# Patient Record
Sex: FEMALE | Race: WHITE | Employment: OTHER | URBAN - METROPOLITAN AREA
[De-identification: names, ages, dates, MRNs, and addresses within clinical notes are randomized per-mention and may not be internally consistent; named-entity substitution may affect disease eponyms.]

---

## 2017-01-17 ENCOUNTER — COMMUNICATION - HEALTHEAST (OUTPATIENT)
Dept: SCHEDULING | Facility: CLINIC | Age: 57
End: 2017-01-17

## 2017-01-23 ENCOUNTER — COMMUNICATION - HEALTHEAST (OUTPATIENT)
Dept: INTERNAL MEDICINE | Facility: CLINIC | Age: 57
End: 2017-01-23

## 2017-01-23 DIAGNOSIS — K21.00 REFLUX ESOPHAGITIS: ICD-10-CM

## 2017-01-23 DIAGNOSIS — R07.9 CHEST PAIN, UNSPECIFIED: ICD-10-CM

## 2017-01-23 DIAGNOSIS — E55.9 VITAMIN D DEFICIENCY: ICD-10-CM

## 2017-01-23 DIAGNOSIS — R00.2 PALPITATIONS: ICD-10-CM

## 2017-01-23 DIAGNOSIS — K30 NONULCER DYSPEPSIA: ICD-10-CM

## 2017-01-26 ENCOUNTER — COMMUNICATION - HEALTHEAST (OUTPATIENT)
Dept: INTERNAL MEDICINE | Facility: CLINIC | Age: 57
End: 2017-01-26

## 2017-01-26 ENCOUNTER — AMBULATORY - HEALTHEAST (OUTPATIENT)
Dept: LAB | Facility: CLINIC | Age: 57
End: 2017-01-26

## 2017-01-26 DIAGNOSIS — E55.9 VITAMIN D DEFICIENCY: ICD-10-CM

## 2017-01-26 DIAGNOSIS — R00.2 PALPITATIONS: ICD-10-CM

## 2017-01-26 DIAGNOSIS — R07.9 CHEST PAIN, UNSPECIFIED: ICD-10-CM

## 2017-01-26 DIAGNOSIS — K21.00 REFLUX ESOPHAGITIS: ICD-10-CM

## 2017-01-26 DIAGNOSIS — E03.9 HYPOTHYROIDISM: ICD-10-CM

## 2017-01-26 DIAGNOSIS — K30 NONULCER DYSPEPSIA: ICD-10-CM

## 2017-01-26 LAB
CHOLEST SERPL-MCNC: 228 MG/DL
FASTING STATUS PATIENT QL REPORTED: YES
HDLC SERPL-MCNC: 55 MG/DL
LDLC SERPL CALC-MCNC: 154 MG/DL
TRIGL SERPL-MCNC: 96 MG/DL

## 2017-02-02 ENCOUNTER — OFFICE VISIT - HEALTHEAST (OUTPATIENT)
Dept: INTERNAL MEDICINE | Facility: CLINIC | Age: 57
End: 2017-02-02

## 2017-02-02 DIAGNOSIS — K21.00 REFLUX ESOPHAGITIS: ICD-10-CM

## 2017-02-02 DIAGNOSIS — E78.5 HYPERLIPEMIA: ICD-10-CM

## 2017-02-02 DIAGNOSIS — E55.9 VITAMIN D DEFICIENCY: ICD-10-CM

## 2017-02-02 DIAGNOSIS — E78.00 HYPERCHOLESTEREMIA: ICD-10-CM

## 2017-02-02 ASSESSMENT — MIFFLIN-ST. JEOR: SCORE: 1341.53

## 2017-07-27 ENCOUNTER — COMMUNICATION - HEALTHEAST (OUTPATIENT)
Dept: INTERNAL MEDICINE | Facility: CLINIC | Age: 57
End: 2017-07-27

## 2017-07-27 DIAGNOSIS — E78.00 HYPERCHOLESTEREMIA: ICD-10-CM

## 2017-08-16 ENCOUNTER — COMMUNICATION - HEALTHEAST (OUTPATIENT)
Dept: INTERNAL MEDICINE | Facility: CLINIC | Age: 57
End: 2017-08-16

## 2017-08-23 ENCOUNTER — AMBULATORY - HEALTHEAST (OUTPATIENT)
Dept: LAB | Facility: CLINIC | Age: 57
End: 2017-08-23

## 2017-08-23 DIAGNOSIS — E78.00 HYPERCHOLESTEREMIA: ICD-10-CM

## 2017-08-23 LAB
CHOLEST SERPL-MCNC: 192 MG/DL
FASTING STATUS PATIENT QL REPORTED: YES
HDLC SERPL-MCNC: 47 MG/DL
LDLC SERPL CALC-MCNC: 127 MG/DL
TRIGL SERPL-MCNC: 89 MG/DL

## 2017-08-24 ENCOUNTER — COMMUNICATION - HEALTHEAST (OUTPATIENT)
Dept: INTERNAL MEDICINE | Facility: CLINIC | Age: 57
End: 2017-08-24

## 2017-09-21 ENCOUNTER — HOSPITAL ENCOUNTER (OUTPATIENT)
Dept: MAMMOGRAPHY | Facility: HOSPITAL | Age: 57
Discharge: HOME | End: 2017-09-21
Attending: OBSTETRICS & GYNECOLOGY

## 2017-09-21 DIAGNOSIS — Z12.31 VISIT FOR SCREENING MAMMOGRAM: ICD-10-CM

## 2017-12-06 ENCOUNTER — RECORDS - HEALTHEAST (OUTPATIENT)
Dept: ADMINISTRATIVE | Facility: OTHER | Age: 57
End: 2017-12-06

## 2018-02-15 ENCOUNTER — COMMUNICATION - HEALTHEAST (OUTPATIENT)
Dept: INTERNAL MEDICINE | Facility: CLINIC | Age: 58
End: 2018-02-15

## 2018-02-15 DIAGNOSIS — E78.5 HYPERLIPEMIA: ICD-10-CM

## 2018-05-29 ENCOUNTER — COMMUNICATION - HEALTHEAST (OUTPATIENT)
Dept: INTERNAL MEDICINE | Facility: CLINIC | Age: 58
End: 2018-05-29

## 2018-05-29 DIAGNOSIS — E78.5 HYPERLIPEMIA: ICD-10-CM

## 2018-06-04 ENCOUNTER — RECORDS - HEALTHEAST (OUTPATIENT)
Dept: ADMINISTRATIVE | Facility: OTHER | Age: 58
End: 2018-06-04

## 2018-07-02 ENCOUNTER — RECORDS - HEALTHEAST (OUTPATIENT)
Dept: ADMINISTRATIVE | Facility: OTHER | Age: 58
End: 2018-07-02

## 2018-07-26 ENCOUNTER — COMMUNICATION - HEALTHEAST (OUTPATIENT)
Dept: INTERNAL MEDICINE | Facility: CLINIC | Age: 58
End: 2018-07-26

## 2018-07-26 DIAGNOSIS — E78.5 HYPERLIPEMIA: ICD-10-CM

## 2018-09-21 ENCOUNTER — HOSPITAL ENCOUNTER (OUTPATIENT)
Dept: MAMMOGRAPHY | Facility: CLINIC | Age: 58
Discharge: HOME | End: 2018-09-21
Attending: OBSTETRICS & GYNECOLOGY

## 2018-09-21 DIAGNOSIS — Z12.31 VISIT FOR SCREENING MAMMOGRAM: ICD-10-CM

## 2018-10-09 ENCOUNTER — OFFICE VISIT - HEALTHEAST (OUTPATIENT)
Dept: INTERNAL MEDICINE | Facility: CLINIC | Age: 58
End: 2018-10-09

## 2018-10-09 DIAGNOSIS — E78.00 HYPERCHOLESTEREMIA: ICD-10-CM

## 2018-10-09 DIAGNOSIS — E55.9 VITAMIN D DEFICIENCY: ICD-10-CM

## 2018-10-09 DIAGNOSIS — Z90.710 H/O: HYSTERECTOMY: ICD-10-CM

## 2018-10-09 DIAGNOSIS — R00.2 PALPITATIONS: ICD-10-CM

## 2018-10-09 DIAGNOSIS — K30 NONULCER DYSPEPSIA: ICD-10-CM

## 2018-10-09 DIAGNOSIS — R07.9 CHEST PAIN, UNSPECIFIED: ICD-10-CM

## 2018-10-09 DIAGNOSIS — K21.00 REFLUX ESOPHAGITIS: ICD-10-CM

## 2018-10-09 LAB
ALBUMIN SERPL-MCNC: 3.8 G/DL (ref 3.5–5)
ALP SERPL-CCNC: 99 U/L (ref 45–120)
ALT SERPL W P-5'-P-CCNC: 30 U/L (ref 0–45)
ANION GAP SERPL CALCULATED.3IONS-SCNC: 9 MMOL/L (ref 5–18)
AST SERPL W P-5'-P-CCNC: 19 U/L (ref 0–40)
BILIRUB SERPL-MCNC: 0.6 MG/DL (ref 0–1)
BUN SERPL-MCNC: 11 MG/DL (ref 8–22)
CALCIUM SERPL-MCNC: 10.2 MG/DL (ref 8.5–10.5)
CHLORIDE BLD-SCNC: 105 MMOL/L (ref 98–107)
CHOLEST SERPL-MCNC: 197 MG/DL
CO2 SERPL-SCNC: 28 MMOL/L (ref 22–31)
CREAT SERPL-MCNC: 0.64 MG/DL (ref 0.6–1.1)
ERYTHROCYTE [DISTWIDTH] IN BLOOD BY AUTOMATED COUNT: 11.7 % (ref 11–14.5)
FASTING STATUS PATIENT QL REPORTED: YES
GFR SERPL CREATININE-BSD FRML MDRD: >60 ML/MIN/1.73M2
GLUCOSE BLD-MCNC: 95 MG/DL (ref 70–125)
HCT VFR BLD AUTO: 44.2 % (ref 35–47)
HDLC SERPL-MCNC: 58 MG/DL
HGB BLD-MCNC: 15.2 G/DL (ref 12–16)
LDLC SERPL CALC-MCNC: 122 MG/DL
MCH RBC QN AUTO: 30.6 PG (ref 27–34)
MCHC RBC AUTO-ENTMCNC: 34.4 G/DL (ref 32–36)
MCV RBC AUTO: 89 FL (ref 80–100)
PLATELET # BLD AUTO: 298 THOU/UL (ref 140–440)
PMV BLD AUTO: 7.3 FL (ref 7–10)
POTASSIUM BLD-SCNC: 4.3 MMOL/L (ref 3.5–5)
PROT SERPL-MCNC: 7.3 G/DL (ref 6–8)
RBC # BLD AUTO: 4.97 MILL/UL (ref 3.8–5.4)
SODIUM SERPL-SCNC: 142 MMOL/L (ref 136–145)
TRIGL SERPL-MCNC: 84 MG/DL
TSH SERPL DL<=0.005 MIU/L-ACNC: 0.95 UIU/ML (ref 0.3–5)
WBC: 5.1 THOU/UL (ref 4–11)

## 2018-10-09 ASSESSMENT — MIFFLIN-ST. JEOR: SCORE: 1250.81

## 2018-10-10 ENCOUNTER — COMMUNICATION - HEALTHEAST (OUTPATIENT)
Dept: INTERNAL MEDICINE | Facility: CLINIC | Age: 58
End: 2018-10-10

## 2018-10-10 LAB — 25(OH)D3 SERPL-MCNC: 50.2 NG/ML (ref 30–80)

## 2018-10-17 ENCOUNTER — COMMUNICATION - HEALTHEAST (OUTPATIENT)
Dept: INTERNAL MEDICINE | Facility: CLINIC | Age: 58
End: 2018-10-17

## 2018-10-17 DIAGNOSIS — E78.5 HYPERLIPEMIA: ICD-10-CM

## 2018-11-26 ENCOUNTER — COMMUNICATION - HEALTHEAST (OUTPATIENT)
Dept: INTERNAL MEDICINE | Facility: CLINIC | Age: 58
End: 2018-11-26

## 2018-11-26 DIAGNOSIS — E78.5 HYPERLIPEMIA: ICD-10-CM

## 2018-11-30 ENCOUNTER — COMMUNICATION - HEALTHEAST (OUTPATIENT)
Dept: INTERNAL MEDICINE | Facility: CLINIC | Age: 58
End: 2018-11-30

## 2018-11-30 DIAGNOSIS — K21.9 GERD (GASTROESOPHAGEAL REFLUX DISEASE): ICD-10-CM

## 2018-11-30 DIAGNOSIS — E78.5 HYPERLIPEMIA: ICD-10-CM

## 2018-12-07 ENCOUNTER — RECORDS - HEALTHEAST (OUTPATIENT)
Dept: ADMINISTRATIVE | Facility: OTHER | Age: 58
End: 2018-12-07

## 2019-01-04 ENCOUNTER — AMBULATORY - HEALTHEAST (OUTPATIENT)
Dept: INTERNAL MEDICINE | Facility: CLINIC | Age: 59
End: 2019-01-04

## 2019-01-04 ENCOUNTER — AMBULATORY - HEALTHEAST (OUTPATIENT)
Dept: NURSING | Facility: CLINIC | Age: 59
End: 2019-01-04

## 2019-01-04 DIAGNOSIS — Z23 NEED FOR VACCINATION: ICD-10-CM

## 2019-01-11 ENCOUNTER — RECORDS - HEALTHEAST (OUTPATIENT)
Dept: ADMINISTRATIVE | Facility: OTHER | Age: 59
End: 2019-01-11

## 2019-06-05 ENCOUNTER — RECORDS - HEALTHEAST (OUTPATIENT)
Dept: ADMINISTRATIVE | Facility: OTHER | Age: 59
End: 2019-06-05

## 2019-06-05 ENCOUNTER — COMMUNICATION - HEALTHEAST (OUTPATIENT)
Dept: INTERNAL MEDICINE | Facility: CLINIC | Age: 59
End: 2019-06-05

## 2019-06-05 ENCOUNTER — AMBULATORY - HEALTHEAST (OUTPATIENT)
Dept: SCHEDULING | Facility: CLINIC | Age: 59
End: 2019-06-05

## 2019-06-05 DIAGNOSIS — Z13.9 ENCOUNTER FOR HEALTH-RELATED SCREENING: ICD-10-CM

## 2019-06-05 DIAGNOSIS — E78.5 HYPERLIPEMIA: ICD-10-CM

## 2019-06-17 ENCOUNTER — TRANSFERRED RECORDS (OUTPATIENT)
Dept: HEALTH INFORMATION MANAGEMENT | Facility: CLINIC | Age: 59
End: 2019-06-17

## 2019-06-17 ENCOUNTER — RECORDS - HEALTHEAST (OUTPATIENT)
Dept: ADMINISTRATIVE | Facility: OTHER | Age: 59
End: 2019-06-17

## 2019-07-15 ENCOUNTER — TRANSFERRED RECORDS (OUTPATIENT)
Dept: HEALTH INFORMATION MANAGEMENT | Facility: CLINIC | Age: 59
End: 2019-07-15

## 2019-07-16 ENCOUNTER — DOCUMENTATION ONLY (OUTPATIENT)
Dept: CARE COORDINATION | Facility: CLINIC | Age: 59
End: 2019-07-16

## 2019-08-03 NOTE — TELEPHONE ENCOUNTER
FUTURE VISIT INFORMATION      FUTURE VISIT INFORMATION:    Date: 8.26.19    Time: 1:00    Location: [facility]  REFERRAL INFORMATION:    Referring provider:  N/A    Referring providers clinic:  N/A    Reason for visit/diagnosis: second opinion for mycosis fungoides- Okay per Bohj     RECORDS REQUESTED FROM:       Clinic name Comments Records Status Imaging Status   [facility] 7.15.19  In Epic     6.17.19 In Epic

## 2019-08-26 ENCOUNTER — PRE VISIT (OUTPATIENT)
Dept: DERMATOLOGY | Facility: CLINIC | Age: 59
End: 2019-08-26

## 2019-08-26 ENCOUNTER — OFFICE VISIT (OUTPATIENT)
Dept: DERMATOLOGY | Facility: CLINIC | Age: 59
End: 2019-08-26
Payer: COMMERCIAL

## 2019-08-26 VITALS — SYSTOLIC BLOOD PRESSURE: 129 MMHG | DIASTOLIC BLOOD PRESSURE: 82 MMHG | OXYGEN SATURATION: 99 % | HEART RATE: 71 BPM

## 2019-08-26 DIAGNOSIS — C84.00 MYCOSIS FUNGOIDES, UNSPECIFIED BODY REGION (H): Primary | ICD-10-CM

## 2019-08-26 RX ORDER — TRIAMCINOLONE ACETONIDE 1 MG/G
OINTMENT TOPICAL
Qty: 80 G | Refills: 2 | Status: SHIPPED | OUTPATIENT
Start: 2019-08-26

## 2019-08-26 RX ORDER — ATORVASTATIN CALCIUM 20 MG/1
TABLET, FILM COATED ORAL
Refills: 9 | COMMUNITY
Start: 2019-08-25 | End: 2022-11-22

## 2019-08-26 RX ORDER — EPINEPHRINE 0.3 MG/.3ML
INJECTION SUBCUTANEOUS
COMMUNITY
Start: 2017-02-02

## 2019-08-26 ASSESSMENT — PAIN SCALES - GENERAL: PAINLEVEL: NO PAIN (0)

## 2019-08-26 NOTE — LETTER
"8/26/2019       RE: Nena Huerta  [address]  [place] [address]     Dear Colleague,    Thank you for referring your patient, Nena Huerta, to the [facility] DERMATOLOGY at [facility]. Please see a copy of my visit note below.    [facility] Dermatology Note      Dermatology Problem List:  1. CTCL, Stage 1A, bx 6/17/19 (read by [facility])  - CBC w/ diff, peripheral smear, LFT and LDH 7/30/19 within normal limits  - Tx: Triamcinolone 0.1% ointment BID  2. Hx of BCC on the right nare  3. Hx of \"pre-melanoma\" posterior right thigh    Encounter Date: Aug 26, 2019    CC:   Chief Complaint   Patient presents with     Derm Problem     Nena is here today  for a second opinion of  Mycosis Fungoides Dx. She has been seen previously at [facility] by Dr.Kristina Sofia.          History of Present Illness:  Ms. Nena Huerta is a 58 year old female who presents in consultation from Dr. Sofia for CTCL. Patient reports her  first noticed the rash in January of this year -- appeared as red marks on her sides. Was entirely asymptomatic at the time. However, over time the rash didn't go away. Has a history of hand eczema and thought it was just dry skin -- however, it still didn't go away with this. She then saw her OBGYN who recommended that she see dermatology. She then had the biopsies which showed MF. Thus far has not started any treatments. Reports 20lb unanticipated weight loss, few night sweats. No fevers, chills, new lumps bumps. Also endorses itching on the back of her scalp/neck.    Past Medical History:   There is no problem list on file for this patient.    No past medical history on file.  No past surgical history on file.  High cholesterol  GERD    Social History:  Patient reports that she has never smoked. She has never used smokeless tobacco.    Family History:  No family history on file.   No known family hx of skin " disease    Medications:  Current Outpatient Medications   Medication Sig Dispense Refill     atorvastatin (LIPITOR) 20 MG tablet   9     EPINEPHrine (EPIPEN/ADRENACLICK/OR ANY BX GENERIC EQUIV) 0.3 MG/0.3ML injection 2-pack USE AS DIRECTED       omeprazole (PRILOSEC) 20 MG DR capsule   10        Allergies   Allergen Reactions     Avocado Anaphylaxis     Bees Anaphylaxis     Morphine Nausea and Vomiting, Hives, Itching and Rash         Review of Systems:  -As per HPI  -Constitutional: Otherwise feeling well today, in usual state of health.  -Skin: As above in HPI. No additional skin concerns.    Physical exam:  Vitals: /82   Pulse 71   SpO2 99%   GEN: This is a well developed, well-nourished female in no acute distress, in a pleasant mood.    SKIN: Full skin, which includes the head/face, both arms, chest, back, abdomen,both legs, genitalia and/or groin buttocks, digits and/or nails, was examined.  -Beaver skin type: II-III  -Ill defined red, slightly shiny and wrinkled appearing digitate patches on the right flank, left lateral breast and left flank (<10% TBSA)  -No other lesions of concern on areas examined.   LYMPH: no cervical, supraclavicular, axillary or inguinal LAD    Impression/Plan:  1. Mycosis fungoides/CTCL, Stage IA: Biopsy 6/17/19 c/w this diagnosis, CBC and peripheral smear from July 2019 within normal limits: Reviewed the etiology and natural history of this skin condition with the patient at length. Discussed that this is an indolent and slowly progressive disease. Reviewed our expectations and goals of care are to control skin disease and monitor for progression. Given limited disease would start with topical corticosteroids.    Start triamcinolone 0.1% ointment BID to affected areas on the trunk    Reviewed that she should monitor for B symptoms (drenching night sweats, unexpected weight loss, fatigue, etc.)          CC Lupe Sofia MD  [facility]  [address] [place] [address]  [place] [address] on close of this encounter.  Follow-up in 3 months, earlier for new or changing lesions.       Dr. Valles staffed the patient.    Staff Involved:  Resident(Shay Perla)/Staff    Shay Perla MD  Dermatology Resident, PGY4    I, Janis Valles MD, saw this patient with the resident and agree with the resident s findings and plan of care as documented in the resident s note.      Again, thank you for allowing me to participate in the care of your patient.      Sincerely,    Janis Valles MD

## 2019-08-26 NOTE — PATIENT INSTRUCTIONS
Mycosis Fungoides  Mycosis fungoides (MF) is the most common type of CTCL, accounting for 50% of all primary CTCL cases. Erythematous patches and plaques with fine scale and tumors that anatomically favor the buttocks and sun-protected areas of the trunk and limbs characterize this subtype. The etiology remains unclear.    MF is approximately twice as common in men as in women, and sex differences in incidence increase with age. MF is mostly seen in older adults (median age at presentation is 50-55 years), although any age group may be affected.    Start Triamcinolone 0.1% ointment two times per day to the affected areas.

## 2019-08-26 NOTE — NURSING NOTE
Chief Complaint   Patient presents with     Derm Problem     Nena is here today  for a second opinion of  Mycosis Fungoides Dx. She has been seen previously at [facility] by Dr.Kristina Sofia.      Georgie Garcia LPN

## 2019-10-04 ENCOUNTER — HEALTH MAINTENANCE LETTER (OUTPATIENT)
Age: 59
End: 2019-10-04

## 2019-10-22 ENCOUNTER — OFFICE VISIT - HEALTHEAST (OUTPATIENT)
Dept: INTERNAL MEDICINE | Facility: CLINIC | Age: 59
End: 2019-10-22

## 2019-10-22 DIAGNOSIS — C84.09 MYCOSIS FUNGOIDES, EXTRANODAL AND SOLID ORGAN SITES (H): ICD-10-CM

## 2019-10-22 DIAGNOSIS — K30 NONULCER DYSPEPSIA: ICD-10-CM

## 2019-10-22 DIAGNOSIS — K21.00 REFLUX ESOPHAGITIS: ICD-10-CM

## 2019-10-22 DIAGNOSIS — E78.5 HYPERLIPEMIA: ICD-10-CM

## 2019-10-22 DIAGNOSIS — E78.00 HYPERCHOLESTEREMIA: ICD-10-CM

## 2019-10-22 DIAGNOSIS — K21.9 GERD (GASTROESOPHAGEAL REFLUX DISEASE): ICD-10-CM

## 2019-10-22 DIAGNOSIS — E55.9 VITAMIN D DEFICIENCY: ICD-10-CM

## 2019-10-22 LAB
ALBUMIN SERPL-MCNC: 4.1 G/DL (ref 3.5–5)
ALP SERPL-CCNC: 99 U/L (ref 45–120)
ALT SERPL W P-5'-P-CCNC: 22 U/L (ref 0–45)
ANION GAP SERPL CALCULATED.3IONS-SCNC: 10 MMOL/L (ref 5–18)
AST SERPL W P-5'-P-CCNC: 16 U/L (ref 0–40)
BILIRUB SERPL-MCNC: 0.8 MG/DL (ref 0–1)
BUN SERPL-MCNC: 14 MG/DL (ref 8–22)
CALCIUM SERPL-MCNC: 10.4 MG/DL (ref 8.5–10.5)
CHLORIDE BLD-SCNC: 105 MMOL/L (ref 98–107)
CHOLEST SERPL-MCNC: 211 MG/DL
CO2 SERPL-SCNC: 29 MMOL/L (ref 22–31)
CREAT SERPL-MCNC: 0.68 MG/DL (ref 0.6–1.1)
ERYTHROCYTE [DISTWIDTH] IN BLOOD BY AUTOMATED COUNT: 11.5 % (ref 11–14.5)
FASTING STATUS PATIENT QL REPORTED: YES
GFR SERPL CREATININE-BSD FRML MDRD: >60 ML/MIN/1.73M2
GLUCOSE BLD-MCNC: 95 MG/DL (ref 70–125)
HCT VFR BLD AUTO: 46.4 % (ref 35–47)
HDLC SERPL-MCNC: 61 MG/DL
HGB BLD-MCNC: 15.7 G/DL (ref 12–16)
LDLC SERPL CALC-MCNC: 131 MG/DL
MCH RBC QN AUTO: 30.8 PG (ref 27–34)
MCHC RBC AUTO-ENTMCNC: 33.8 G/DL (ref 32–36)
MCV RBC AUTO: 91 FL (ref 80–100)
PLATELET # BLD AUTO: 302 THOU/UL (ref 140–440)
PMV BLD AUTO: 7.6 FL (ref 7–10)
POTASSIUM BLD-SCNC: 4.7 MMOL/L (ref 3.5–5)
PROT SERPL-MCNC: 7.1 G/DL (ref 6–8)
RBC # BLD AUTO: 5.09 MILL/UL (ref 3.8–5.4)
SODIUM SERPL-SCNC: 144 MMOL/L (ref 136–145)
TRIGL SERPL-MCNC: 95 MG/DL
TSH SERPL DL<=0.005 MIU/L-ACNC: 0.75 UIU/ML (ref 0.3–5)
WBC: 4.9 THOU/UL (ref 4–11)

## 2019-10-22 ASSESSMENT — MIFFLIN-ST. JEOR: SCORE: 1269.14

## 2019-10-23 ENCOUNTER — COMMUNICATION - HEALTHEAST (OUTPATIENT)
Dept: ONCOLOGY | Facility: HOSPITAL | Age: 59
End: 2019-10-23

## 2019-10-23 LAB — 25(OH)D3 SERPL-MCNC: 49.2 NG/ML (ref 30–80)

## 2019-10-24 ENCOUNTER — COMMUNICATION - HEALTHEAST (OUTPATIENT)
Dept: INTERNAL MEDICINE | Facility: CLINIC | Age: 59
End: 2019-10-24

## 2019-10-28 ENCOUNTER — HOSPITAL ENCOUNTER (OUTPATIENT)
Dept: MAMMOGRAPHY | Facility: CLINIC | Age: 59
Discharge: HOME | End: 2019-10-28
Attending: INTERNAL MEDICINE

## 2019-10-28 DIAGNOSIS — Z12.31 VISIT FOR SCREENING MAMMOGRAM: ICD-10-CM

## 2019-11-11 ENCOUNTER — OFFICE VISIT - HEALTHEAST (OUTPATIENT)
Dept: ONCOLOGY | Facility: HOSPITAL | Age: 59
End: 2019-11-11

## 2019-11-11 DIAGNOSIS — C84.09 MYCOSIS FUNGOIDES, EXTRANODAL AND SOLID ORGAN SITES (H): ICD-10-CM

## 2019-11-11 DIAGNOSIS — E78.5 HYPERLIPEMIA: ICD-10-CM

## 2019-11-11 DIAGNOSIS — K21.9 GERD (GASTROESOPHAGEAL REFLUX DISEASE): ICD-10-CM

## 2019-11-11 ASSESSMENT — MIFFLIN-ST. JEOR: SCORE: 1266.57

## 2019-11-25 ENCOUNTER — OFFICE VISIT (OUTPATIENT)
Dept: DERMATOLOGY | Facility: CLINIC | Age: 59
End: 2019-11-25
Payer: COMMERCIAL

## 2019-11-25 DIAGNOSIS — C84.00 MYCOSIS FUNGOIDES, UNSPECIFIED BODY REGION (H): Primary | ICD-10-CM

## 2019-11-25 ASSESSMENT — PAIN SCALES - GENERAL: PAINLEVEL: NO PAIN (0)

## 2019-11-25 NOTE — NURSING NOTE
Dermatology Rooming Note    Nena Huerta's goals for this visit include:   Chief Complaint   Patient presents with     Derm Problem     Nena is here for a ctcl skin check, states new areas under her breasts.         Nanda Alvarado LPN

## 2019-11-25 NOTE — PROGRESS NOTES
"[facility] Dermatology Note      Dermatology Problem List:  1. CTCL, Stage 1A, bx 19 (read by [facility])  - CBC w/ diff, peripheral smear, LFT and LDH 19 within normal limits  - Tx: Triamcinolone 0.1% ointment BID  2. Hx of BCC on the right nare  3. Hx of \"pre-melanoma\" posterior right thigh    Encounter Date: 2019    CC:   Chief Complaint   Patient presents with     Derm Problem     Nena is here for a ctcl skin check, states new areas under her breasts.           History of Present Illness:  Ms. Nena Huerta is a 59 year old female who presents as a follow-up for patch stage IA CTCL. The patient was last seen 19 when she was started on TAC 0.1% ointment BID to affected areas.  She has been only applying triamcinolone ointment, but in fact has stopped using since the beginning of November because she was concerned about the skin thinning effects. She states that her rash is much better, though she has gotten a new rash under her abdomen over her  line. She was prescribed an antifungal for this rash from her regular dermatologist but she has not used it yet.  She denies F/C/unintentional weight loss at this time. She does endorse night sweats on occasion, but these are not new and present at last visit. She has also seen Oncology who reassured her of her early stage MF.       Past Medical History:   There is no problem list on file for this patient.    No past medical history on file.  No past surgical history on file.    Social History:  Patient reports that she has never smoked. She has never used smokeless tobacco.    Family History:  No family history on file.    Medications:  Current Outpatient Medications   Medication Sig Dispense Refill     atorvastatin (LIPITOR) 20 MG tablet   9     EPINEPHrine (EPIPEN/ADRENACLICK/OR ANY BX GENERIC EQUIV) 0.3 MG/0.3ML injection 2-pack USE AS DIRECTED       omeprazole (PRILOSEC) 20 MG DR capsule   10     triamcinolone (KENALOG) " 0.1 % external ointment Apply two times per day to the affected areas on the trunk. 80 g 2        Allergies   Allergen Reactions     Avocado Anaphylaxis     Bees Anaphylaxis     Morphine Nausea and Vomiting, Hives, Itching and Rash         Review of Systems:  -As per HPI  -Constitutional: Otherwise feeling well today, in usual state of health.  -Skin: As above in HPI. No additional skin concerns.    Physical exam:  Vitals: There were no vitals taken for this visit.  GEN: This is a well developed, well-nourished female in no acute distress, in a pleasant mood.    SKIN: Total skin excluding the undergarment areas was performed. The exam included the head/face, neck, both arms, chest, back, abdomen, both legs, digits and/or nails.   -Beaver skin type: I  -Very faint pink circular patch on the right upper abdomen  -Faint pink patch extending under the abdoimdal pannus, with several satellite pink papules  -No other lesions of concern on areas examined.   LYMPH: No cervical, supraclavicular, axillary, inguinal or popliteal LAD    Impression/Plan:  1. Patch stage IA CTCL- Limited rash on exam, very faint. Discussed good prognosis of her limited stage MF. Ok to just use the topical steroid on an as-needed basis and discussed side effects/and use of topical steroids.    Continue TAC 0.1% ointment BID PRN; discussed can use 2 weeks on, 1 week off    2. Cutaneous candidiasis/intertrigo- Patient was given a topical antifungal by outside dermatologist. She has not used yet.     Encouraged to use prescribed topical antifungal cream BID to the rash under the abdomen      CC Lupe Sofia MD  [facility]  [address] [place] [address] on close of this encounter.  Follow-up in 1 year, earlier for new or changing lesions.       Dr. Valles staffed the patient.    Staff Involved:  Resident(Roseanna Catherine)/Staff  I, Janis Valles MD, saw this patient with the resident and agree with  the resident s findings and plan of care as documented in the resident s note.

## 2019-11-25 NOTE — LETTER
"2019       RE: Nena Huerta  [address]  [place] [address]     Dear Colleague,    Thank you for referring your patient, Nena Huerta, to the [facility] DERMATOLOGY at [facility]. Please see a copy of my visit note below.    [facility] Dermatology Note      Dermatology Problem List:  1. CTCL, Stage 1A, bx 19 (read by [facility])  - CBC w/ diff, peripheral smear, LFT and LDH 19 within normal limits  - Tx: Triamcinolone 0.1% ointment BID  2. Hx of BCC on the right nare  3. Hx of \"pre-melanoma\" posterior right thigh    Encounter Date: 2019    CC:   Chief Complaint   Patient presents with     Derm Problem     Nena is here for a ctcl skin check, states new areas under her breasts.           History of Present Illness:  Ms. Nena Huerta is a 59 year old female who presents as a follow-up for patch stage IA CTCL. The patient was last seen 19 when she was started on TAC 0.1% ointment BID to affected areas.  She has been only applying triamcinolone ointment, but in fact has stopped using since the beginning of November because she was concerned about the skin thinning effects. She states that her rash is much better, though she has gotten a new rash under her abdomen over her  line. She was prescribed an antifungal for this rash from her regular dermatologist but she has not used it yet.  She denies F/C/unintentional weight loss at this time. She does endorse night sweats on occasion, but these are not new and present at last visit. She has also seen Oncology who reassured her of her early stage MF.       Past Medical History:   There is no problem list on file for this patient.    No past medical history on file.  No past surgical history on file.    Social History:  Patient reports that she has never smoked. She has never used smokeless tobacco.    Family History:  No family history on file.    Medications:  Current Outpatient " Medications   Medication Sig Dispense Refill     atorvastatin (LIPITOR) 20 MG tablet   9     EPINEPHrine (EPIPEN/ADRENACLICK/OR ANY BX GENERIC EQUIV) 0.3 MG/0.3ML injection 2-pack USE AS DIRECTED       omeprazole (PRILOSEC) 20 MG DR capsule   10     triamcinolone (KENALOG) 0.1 % external ointment Apply two times per day to the affected areas on the trunk. 80 g 2        Allergies   Allergen Reactions     Avocado Anaphylaxis     Bees Anaphylaxis     Morphine Nausea and Vomiting, Hives, Itching and Rash         Review of Systems:  -As per HPI  -Constitutional: Otherwise feeling well today, in usual state of health.  -Skin: As above in HPI. No additional skin concerns.    Physical exam:  Vitals: There were no vitals taken for this visit.  GEN: This is a well developed, well-nourished female in no acute distress, in a pleasant mood.    SKIN: Total skin excluding the undergarment areas was performed. The exam included the head/face, neck, both arms, chest, back, abdomen, both legs, digits and/or nails.   -Beaver skin type: I  -Very faint pink circular patch on the right upper abdomen  -Faint pink patch extending under the abdoimdal pannus, with several satellite pink papules  -No other lesions of concern on areas examined.   LYMPH: No cervical, supraclavicular, axillary, inguinal or popliteal LAD    Impression/Plan:  1. Patch stage IA CTCL- Limited rash on exam, very faint. Discussed good prognosis of her limited stage MF. Ok to just use the topical steroid on an as-needed basis and discussed side effects/and use of topical steroids.    Continue TAC 0.1% ointment BID PRN; discussed can use 2 weeks on, 1 week off    2. Cutaneous candidiasis/intertrigo- Patient was given a topical antifungal by outside dermatologist. She has not used yet.     Encouraged to use prescribed topical antifungal cream BID to the rash under the abdomen      CC Lupe Sofia MD  [facility]  [address] [place] [address]   [address]  [place] [address] on close of this encounter.  Follow-up in 1 year, earlier for new or changing lesions.       Dr. Valles staffed the patient.    Staff Involved:  Resident(Roseanna Catherine)/Staff  I, Janis Valles MD, saw this patient with the resident and agree with the resident s findings and plan of care as documented in the resident s note.

## 2019-11-25 NOTE — PATIENT INSTRUCTIONS
Ok to continue triamcinolone ointment for rash or more itching. Use twice a day for 2 weeks, then take 1 week off  Apply antifungal you have been prescribed from your dermatologist twice a day to the rash under the abdomen

## 2019-12-20 ENCOUNTER — RECORDS - HEALTHEAST (OUTPATIENT)
Dept: ADMINISTRATIVE | Facility: OTHER | Age: 59
End: 2019-12-20

## 2020-01-02 ENCOUNTER — RECORDS - HEALTHEAST (OUTPATIENT)
Dept: ADMINISTRATIVE | Facility: OTHER | Age: 60
End: 2020-01-02

## 2020-02-11 ENCOUNTER — OFFICE VISIT - HEALTHEAST (OUTPATIENT)
Dept: INTERNAL MEDICINE | Facility: CLINIC | Age: 60
End: 2020-02-11

## 2020-02-11 DIAGNOSIS — E78.5 HYPERLIPEMIA: ICD-10-CM

## 2020-02-11 DIAGNOSIS — C84.A0 CUTANEOUS T-CELL LYMPHOMA, UNSPECIFIED BODY REGION (H): ICD-10-CM

## 2020-02-11 DIAGNOSIS — K21.9 GERD (GASTROESOPHAGEAL REFLUX DISEASE): ICD-10-CM

## 2020-02-11 DIAGNOSIS — M54.50 LOW BACK PAIN, UNSPECIFIED BACK PAIN LATERALITY, UNSPECIFIED CHRONICITY, UNSPECIFIED WHETHER SCIATICA PRESENT: ICD-10-CM

## 2020-02-11 ASSESSMENT — MIFFLIN-ST. JEOR: SCORE: 1279.73

## 2020-07-08 NOTE — PROGRESS NOTES
"[facility] Dermatology Note      Dermatology Problem List:  1. CTCL, Stage 1A, bx 6/17/19 (read by [facility])  - CBC w/ diff, peripheral smear, LFT and LDH 7/30/19 within normal limits  - Tx: Triamcinolone 0.1% ointment BID  2. Hx of BCC on the right nare  3. Hx of \"pre-melanoma\" posterior right thigh    Encounter Date: Aug 26, 2019    CC:   Chief Complaint   Patient presents with     Derm Problem     Nena is here today  for a second opinion of  Mycosis Fungoides Dx. She has been seen previously at [facility] by Dr.Kristina Sofia.          History of Present Illness:  Ms. Nena Huerta is a 58 year old female who presents in consultation from Dr. Sofia for CTCL. Patient reports her  first noticed the rash in January of this year -- appeared as red marks on her sides. Was entirely asymptomatic at the time. However, over time the rash didn't go away. Has a history of hand eczema and thought it was just dry skin -- however, it still didn't go away with this. She then saw her OBGYN who recommended that she see dermatology. She then had the biopsies which showed MF. Thus far has not started any treatments. Reports 20lb unanticipated weight loss, few night sweats. No fevers, chills, new lumps bumps. Also endorses itching on the back of her scalp/neck.    Past Medical History:   There is no problem list on file for this patient.    No past medical history on file.  No past surgical history on file.  High cholesterol  GERD    Social History:  Patient reports that she has never smoked. She has never used smokeless tobacco.    Family History:  No family history on file.   No known family hx of skin disease    Medications:  Current Outpatient Medications   Medication Sig Dispense Refill     atorvastatin (LIPITOR) 20 MG tablet   9     EPINEPHrine (EPIPEN/ADRENACLICK/OR ANY BX GENERIC EQUIV) 0.3 MG/0.3ML injection 2-pack USE AS DIRECTED       omeprazole (PRILOSEC) 20 MG DR capsule   10 " Rx Request        Allergies   Allergen Reactions     Avocado Anaphylaxis     Bees Anaphylaxis     Morphine Nausea and Vomiting, Hives, Itching and Rash         Review of Systems:  -As per HPI  -Constitutional: Otherwise feeling well today, in usual state of health.  -Skin: As above in HPI. No additional skin concerns.    Physical exam:  Vitals: /82   Pulse 71   SpO2 99%   GEN: This is a well developed, well-nourished female in no acute distress, in a pleasant mood.    SKIN: Full skin, which includes the head/face, both arms, chest, back, abdomen,both legs, genitalia and/or groin buttocks, digits and/or nails, was examined.  -Beaver skin type: II-III  -Ill defined red, slightly shiny and wrinkled appearing digitate patches on the right flank, left lateral breast and left flank (<10% TBSA)  -No other lesions of concern on areas examined.   LYMPH: no cervical, supraclavicular, axillary or inguinal LAD    Impression/Plan:  1. Mycosis fungoides/CTCL, Stage IA: Biopsy 6/17/19 c/w this diagnosis, CBC and peripheral smear from July 2019 within normal limits: Reviewed the etiology and natural history of this skin condition with the patient at length. Discussed that this is an indolent and slowly progressive disease. Reviewed our expectations and goals of care are to control skin disease and monitor for progression. Given limited disease would start with topical corticosteroids.    Start triamcinolone 0.1% ointment BID to affected areas on the trunk    Reviewed that she should monitor for B symptoms (drenching night sweats, unexpected weight loss, fatigue, etc.)           Lupe Sofia MD  [facility]  [address] [place] [address] on close of this encounter.  Follow-up in 3 months, earlier for new or changing lesions.       Dr. Valles staffed the patient.    Staff Involved:  Resident(Shay Perla)/Staff    Shay Perla MD  Dermatology Resident, PGY4    I, Janis Valles MD, saw this  patient with the resident and agree with the resident s findings and plan of care as documented in the resident s note.

## 2020-10-06 ENCOUNTER — OFFICE VISIT - HEALTHEAST (OUTPATIENT)
Dept: INTERNAL MEDICINE | Facility: CLINIC | Age: 60
End: 2020-10-06

## 2020-10-06 DIAGNOSIS — Z00.00 ROUTINE GENERAL MEDICAL EXAMINATION AT A HEALTH CARE FACILITY: ICD-10-CM

## 2020-10-06 DIAGNOSIS — E78.5 HYPERLIPEMIA: ICD-10-CM

## 2020-10-06 DIAGNOSIS — K21.9 GERD (GASTROESOPHAGEAL REFLUX DISEASE): ICD-10-CM

## 2020-10-06 LAB
ALBUMIN SERPL-MCNC: 3.7 G/DL (ref 3.5–5)
ALP SERPL-CCNC: 89 U/L (ref 45–120)
ALT SERPL W P-5'-P-CCNC: 24 U/L (ref 0–45)
ANION GAP SERPL CALCULATED.3IONS-SCNC: 7 MMOL/L (ref 5–18)
AST SERPL W P-5'-P-CCNC: 15 U/L (ref 0–40)
BASOPHILS # BLD AUTO: 0 THOU/UL (ref 0–0.2)
BASOPHILS NFR BLD AUTO: 1 % (ref 0–2)
BILIRUB SERPL-MCNC: 0.7 MG/DL (ref 0–1)
BUN SERPL-MCNC: 14 MG/DL (ref 8–22)
CALCIUM SERPL-MCNC: 9.7 MG/DL (ref 8.5–10.5)
CHLORIDE BLD-SCNC: 104 MMOL/L (ref 98–107)
CHOLEST SERPL-MCNC: 174 MG/DL
CO2 SERPL-SCNC: 30 MMOL/L (ref 22–31)
CREAT SERPL-MCNC: 0.61 MG/DL (ref 0.6–1.1)
EOSINOPHIL # BLD AUTO: 0.1 THOU/UL (ref 0–0.4)
EOSINOPHIL NFR BLD AUTO: 2 % (ref 0–6)
ERYTHROCYTE [DISTWIDTH] IN BLOOD BY AUTOMATED COUNT: 11.1 % (ref 11–14.5)
FASTING STATUS PATIENT QL REPORTED: YES
GFR SERPL CREATININE-BSD FRML MDRD: >60 ML/MIN/1.73M2
GLUCOSE BLD-MCNC: 99 MG/DL (ref 70–125)
HCT VFR BLD AUTO: 43.8 % (ref 35–47)
HDLC SERPL-MCNC: 54 MG/DL
HGB BLD-MCNC: 14.8 G/DL (ref 12–16)
LDLC SERPL CALC-MCNC: 103 MG/DL
LYMPHOCYTES # BLD AUTO: 1.3 THOU/UL (ref 0.8–4.4)
LYMPHOCYTES NFR BLD AUTO: 24 % (ref 20–40)
MCH RBC QN AUTO: 30.8 PG (ref 27–34)
MCHC RBC AUTO-ENTMCNC: 33.8 G/DL (ref 32–36)
MCV RBC AUTO: 91 FL (ref 80–100)
MONOCYTES # BLD AUTO: 0.4 THOU/UL (ref 0–0.9)
MONOCYTES NFR BLD AUTO: 7 % (ref 2–10)
NEUTROPHILS # BLD AUTO: 3.6 THOU/UL (ref 2–7.7)
NEUTROPHILS NFR BLD AUTO: 67 % (ref 50–70)
PLATELET # BLD AUTO: 274 THOU/UL (ref 140–440)
PMV BLD AUTO: 7.6 FL (ref 7–10)
POTASSIUM BLD-SCNC: 4.4 MMOL/L (ref 3.5–5)
PROT SERPL-MCNC: 6.9 G/DL (ref 6–8)
RBC # BLD AUTO: 4.81 MILL/UL (ref 3.8–5.4)
SODIUM SERPL-SCNC: 141 MMOL/L (ref 136–145)
TRIGL SERPL-MCNC: 84 MG/DL
TSH SERPL DL<=0.005 MIU/L-ACNC: 0.97 UIU/ML (ref 0.3–5)
WBC: 5.3 THOU/UL (ref 4–11)

## 2020-10-06 ASSESSMENT — MIFFLIN-ST. JEOR: SCORE: 1233.8

## 2020-10-20 ENCOUNTER — RECORDS - HEALTHEAST (OUTPATIENT)
Dept: ADMINISTRATIVE | Facility: OTHER | Age: 60
End: 2020-10-20

## 2020-10-26 ENCOUNTER — COMMUNICATION - HEALTHEAST (OUTPATIENT)
Dept: INTERNAL MEDICINE | Facility: CLINIC | Age: 60
End: 2020-10-26

## 2020-10-27 ENCOUNTER — COMMUNICATION - HEALTHEAST (OUTPATIENT)
Dept: INTERNAL MEDICINE | Facility: CLINIC | Age: 60
End: 2020-10-27

## 2020-11-03 ENCOUNTER — HOSPITAL ENCOUNTER (OUTPATIENT)
Dept: MAMMOGRAPHY | Facility: CLINIC | Age: 60
Discharge: HOME | End: 2020-11-03

## 2020-11-03 DIAGNOSIS — Z12.31 VISIT FOR SCREENING MAMMOGRAM: ICD-10-CM

## 2020-11-08 ENCOUNTER — HEALTH MAINTENANCE LETTER (OUTPATIENT)
Age: 60
End: 2020-11-08

## 2020-11-17 ENCOUNTER — RECORDS - HEALTHEAST (OUTPATIENT)
Dept: ADMINISTRATIVE | Facility: OTHER | Age: 60
End: 2020-11-17

## 2020-11-23 ENCOUNTER — OFFICE VISIT (OUTPATIENT)
Dept: DERMATOLOGY | Facility: CLINIC | Age: 60
End: 2020-11-23
Payer: COMMERCIAL

## 2020-11-23 DIAGNOSIS — C84.00 MYCOSIS FUNGOIDES, UNSPECIFIED BODY REGION (H): ICD-10-CM

## 2020-11-23 PROCEDURE — 99214 OFFICE O/P EST MOD 30 MIN: CPT | Mod: GC | Performed by: DERMATOLOGY

## 2020-11-23 ASSESSMENT — PAIN SCALES - GENERAL: PAINLEVEL: NO PAIN (0)

## 2020-11-23 NOTE — LETTER
"11/23/2020       RE: Nena Huerta  [address] [address]     Dear Colleague,    Thank you for referring your patient, Nena Huerta, to the [facility]OLOG[facility]IC [place] at [facility]. Please see a copy of my visit note below.    [facility] Dermatology Note      Dermatology Problem List:  1. CTCL, Stage 1A, bx 6/17/19 (read by [facility])  - CBC w/ diff, peripheral smear, LFT and LDH 7/30/19 within normal limits  - Tx: Triamcinolone 0.1% ointment BID  2. Hx of BCC on the right nare  3. 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          History of Present Illness:  Ms. Nena Huerta is a 60 year old female who presents as a follow-up for patch stage IA CTCL. The patient was last seen 11/25/2019 when she was continued on triamcinolone 0.1% ointment BID to affected areas. She says that she does have some spots on her right lateral trunk, on the left breast and she thinks maybe some on the back. She occasionally uses triamcinolone ointment when these spots get very itchy, but she doesn't use it routinely.     She also had some candidal intertrigo at last visit and was encouraged to use antifungal. She manages this by using baby powder and keeping area dry.     She reports that she also had a large tumor grow on her right dorsal thumb that was excised on 11/6/2020 and found to be a giant cell tumor of the tendon sheath.     She denies F/C/unintentional weight loss at this time. She does endorse night sweats on occasion, but these are not new and present at last visit. She has also seen Oncology who reassured her of her early stage MF.    Past Medical History:   There is no problem list on file " for this patient.    No past medical history on file.  No past surgical history on file.    Social History:  Patient reports that she has never smoked. She has never used smokeless tobacco.    Family History:  No family history on file.    Medications:  Current Outpatient Medications   Medication Sig Dispense Refill     atorvastatin (LIPITOR) 20 MG tablet   9     EPINEPHrine (EPIPEN/ADRENACLICK/OR ANY BX GENERIC EQUIV) 0.3 MG/0.3ML injection 2-pack USE AS DIRECTED       omeprazole (PRILOSEC) 20 MG DR capsule   10     triamcinolone (KENALOG) 0.1 % external ointment Apply two times per day to the affected areas on the trunk. 80 g 2        Allergies   Allergen Reactions     Avocado Anaphylaxis     Bees Anaphylaxis     Morphine Nausea and Vomiting, Hives, Itching and Rash         Review of Systems:  -As per HPI  -Constitutional: Otherwise feeling well today, in usual state of health.  -Skin: As above in HPI. No additional skin concerns.    Physical exam:  Vitals: There were no vitals taken for this visit.  GEN: This is a well developed, well-nourished female in no acute distress, in a pleasant mood.    SKIN: Total skin excluding the undergarment areas was performed. The exam included the head/face, neck, both arms, chest, back, abdomen, both legs, digits and/or nails.   -Beaver skin type: II  -there are three linear pink, slightly indurated plaques on the right lateral trunk  -there are several pink, indurated plaques on the left upper chest and breast  -there is one pink, indurated plaque on the left low back  -No other lesions of concern on areas examined.   Lymph: no LAD appreciated at the cervical, supraclavicular, axillary, inguinal lymph nodes    Labs:  Reviewed in care everywhere.     Impression/Plan:  1. CTCL, patch stage IA: stable; patient using triamcinolone 0.1% ointment sparingly when plaques become very red or itchy. Reassured patient that giant cell tumor of tendon sheath is not related to her CTCL.    - may continue to use triamcinolone 0.1% ointment twice daily to areas of CTCL    CC Lupe Sofia MD  [facility]  [address] [place] [address] on close of this encounter.  Follow-up in 1 year, earlier for new or changing lesions.       Dr. Valles staffed the patient.    Staff Involved:  Resident(Rafael)/Staff    Zhen Hall MD, PhD  Med-Derm PGY-5  I, Janis Valles MD, saw this patient with the resident and agree with the resident s findings and plan of care as documented in the resident s note.

## 2020-11-23 NOTE — NURSING NOTE
Dermatology Rooming Note    Nena Huerta's goals for this visit include:   Chief Complaint   Patient presents with     Derm Problem     Nena is here for a ctcl skim check, states overall things are the same.        Nanda Alvarado LPN

## 2020-11-23 NOTE — PROGRESS NOTES
"[facility] Dermatology Note      Dermatology Problem List:  1. 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 Hx of BCC on the right nare  3. 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          History of Present Illness:  Ms. Nena Huerta is a 60 year old female who presents as a follow-up for patch stage IA CTCL. The patient was last seen 11/25/2019 when she was continued on triamcinolone 0.1% ointment BID to affected areas. She says that she does have some spots on her right lateral trunk, on the left breast and she thinks maybe some on the back. She occasionally uses triamcinolone ointment when these spots get very itchy, but she doesn't use it routinely.     She also had some candidal intertrigo at last visit and was encouraged to use antifungal. She manages this by using baby powder and keeping area dry.     She reports that she also had a large tumor grow on her right dorsal thumb that was excised on 11/6/2020 and found to be a giant cell tumor of the tendon sheath.     She denies F/C/unintentional weight loss at this time. She does endorse night sweats on occasion, but these are not new and present at last visit. She has also seen Oncology who reassured her of her early stage MF.    Past Medical History:   There is no problem list on file for this patient.    No past medical history on file.  No past surgical history on file.    Social History:  Patient reports that she has never smoked. She has never used smokeless tobacco.    Family History:  No family history on file.    Medications:  Current Outpatient Medications   Medication Sig " Dispense Refill     atorvastatin (LIPITOR) 20 MG tablet   9     EPINEPHrine (EPIPEN/ADRENACLICK/OR ANY BX GENERIC EQUIV) 0.3 MG/0.3ML injection 2-pack USE AS DIRECTED       omeprazole (PRILOSEC) 20 MG DR capsule   10     triamcinolone (KENALOG) 0.1 % external ointment Apply two times per day to the affected areas on the trunk. 80 g 2        Allergies   Allergen Reactions     Avocado Anaphylaxis     Bees Anaphylaxis     Morphine Nausea and Vomiting, Hives, Itching and Rash         Review of Systems:  -As per HPI  -Constitutional: Otherwise feeling well today, in usual state of health.  -Skin: As above in HPI. No additional skin concerns.    Physical exam:  Vitals: There were no vitals taken for this visit.  GEN: This is a well developed, well-nourished female in no acute distress, in a pleasant mood.    SKIN: Total skin excluding the undergarment areas was performed. The exam included the head/face, neck, both arms, chest, back, abdomen, both legs, digits and/or nails.   -Beaver skin type: II  -there are three linear pink, slightly indurated plaques on the right lateral trunk  -there are several pink, indurated plaques on the left upper chest and breast  -there is one pink, indurated plaque on the left low back  -No other lesions of concern on areas examined.   Lymph: no LAD appreciated at the cervical, supraclavicular, axillary, inguinal lymph nodes    Labs:  Reviewed in care everywhere.     Impression/Plan:  1. CTCL, patch stage IA: stable; patient using triamcinolone 0.1% ointment sparingly when plaques become very red or itchy. Reassured patient that giant cell tumor of tendon sheath is not related to her CTCL.   - may continue to use triamcinolone 0.1% ointment twice daily to areas of CTCL    CC Lupe Sofia MD  [facility]  [address] [place] [address] on close of this encounter.  Follow-up in 1 year, earlier for new or changing lesions.       Dr. Valles staffed the  patient.    Staff Involved:  Resident(Rafael)/Staff    Zhen Hall MD, PhD  Med-Derm PGY-5  I, Janis Valles MD, saw this patient with the resident and agree with the resident s findings and plan of care as documented in the resident s note.

## 2020-12-11 ENCOUNTER — RECORDS - HEALTHEAST (OUTPATIENT)
Dept: ADMINISTRATIVE | Facility: OTHER | Age: 60
End: 2020-12-11

## 2020-12-15 ENCOUNTER — RECORDS - HEALTHEAST (OUTPATIENT)
Dept: ADMINISTRATIVE | Facility: OTHER | Age: 60
End: 2020-12-15

## 2020-12-18 ENCOUNTER — RECORDS - HEALTHEAST (OUTPATIENT)
Dept: ADMINISTRATIVE | Facility: OTHER | Age: 60
End: 2020-12-18

## 2021-01-05 ENCOUNTER — RECORDS - HEALTHEAST (OUTPATIENT)
Dept: ADMINISTRATIVE | Facility: OTHER | Age: 61
End: 2021-01-05

## 2021-02-05 ENCOUNTER — RECORDS - HEALTHEAST (OUTPATIENT)
Dept: ADMINISTRATIVE | Facility: OTHER | Age: 61
End: 2021-02-05

## 2021-05-03 ENCOUNTER — TELEPHONE (OUTPATIENT)
Dept: DERMATOLOGY | Facility: CLINIC | Age: 61
End: 2021-05-03

## 2021-05-03 NOTE — TELEPHONE ENCOUNTER
[facility] Call Center    Phone Message    May a detailed message be left on voicemail: yes     Reason for Call: Appointment Intake    Referring Provider Name:   Current Pt of Dr Valles    Diagnosis and/or Symptoms:   Cutaneous T-Cell Lymphoma (CTCL)    Action Taken: Message routed to:  [facility] Derm    Travel Screening: Not Applicable     Dr Valles's template not updated for Nov 2021.      Please return call to Pt to schedule her for her annual Follow-up appt or advise when she should call back to schedule. Pt would like to be seen on 11/22 or 11/23.    Thanks!

## 2021-05-24 ENCOUNTER — RECORDS - HEALTHEAST (OUTPATIENT)
Dept: ADMINISTRATIVE | Facility: CLINIC | Age: 61
End: 2021-05-24

## 2021-05-25 ENCOUNTER — RECORDS - HEALTHEAST (OUTPATIENT)
Dept: ADMINISTRATIVE | Facility: CLINIC | Age: 61
End: 2021-05-25

## 2021-05-26 ENCOUNTER — RECORDS - HEALTHEAST (OUTPATIENT)
Dept: ADMINISTRATIVE | Facility: CLINIC | Age: 61
End: 2021-05-26

## 2021-05-27 ENCOUNTER — RECORDS - HEALTHEAST (OUTPATIENT)
Dept: ADMINISTRATIVE | Facility: CLINIC | Age: 61
End: 2021-05-27

## 2021-05-28 ENCOUNTER — RECORDS - HEALTHEAST (OUTPATIENT)
Dept: ADMINISTRATIVE | Facility: CLINIC | Age: 61
End: 2021-05-28

## 2021-05-30 VITALS — HEIGHT: 63 IN | BODY MASS INDEX: 31.54 KG/M2 | WEIGHT: 178 LBS

## 2021-06-02 VITALS — HEIGHT: 63 IN | BODY MASS INDEX: 28 KG/M2 | WEIGHT: 158 LBS

## 2021-06-02 NOTE — PROGRESS NOTES
"Office Visit - Follow up    Nena Huerta   58 y.o. female    Date of Visit: 10/22/2019    Chief Complaint   Patient presents with     Follow-up     Fasting-labs due     Flu Vaccine     Medication Refill     Medication Questions     Discuss MDP. Uses with Epi Pen       Subjective: Rightful 58-year-old patient seen today with her  for follow-up.  Recently diagnosed with mycosis fungoides and is followed by her dermatologist.  We had a very long discussion about this.  Has responded to therapy however she is concerned about diagnosis of low glade lymphoma and I have offered her consultation with oncology we will make this referral as appropriate although I feel that her overall long-term prognosis is excellent    History of hyperlipidemia reflux esophagitis and of non-ulcer dyspepsia as well as vitamin D deficiency    Comprehensive labs are ordered.    Current medications reviewed she has been off of her Medrol.  Takes omeprazole with excellent control of symptoms.    No other new changes or concerns    Allergies reviewed        ROS: A comprehensive review of systems was performed and was otherwise negative except as mentioned above.     Exam  She does have minimal skin changes in the right flank region.  I do not appreciate any residual evidence of mycosis fungoides I have reassured her.  Head and neck negative heart and lungs unremarkable       /80 (Patient Site: Left Arm, Patient Position: Sitting, Cuff Size: Adult Regular)   Pulse 62   Ht 5' 3\" (1.6 m)   Wt 162 lb 0.6 oz (73.5 kg)   LMP 08/12/2012   SpO2 99%   Breastfeeding? No   BMI 28.70 kg/m      Assessment and Plan  Low-grade lymphoma/mycosis fungoides.  I have recommended reassurance and discussion with an oncologist and will arrange for this    No other changes or concerns    Nena was seen today for follow-up, flu vaccine, medication refill and medication questions.    Diagnoses and all orders for this visit:    Mycosis fungoides, " extranodal and solid organ sites (H)  -     EPINEPHrine (EPIPEN 2-DWAINE) 0.3 mg/0.3 mL injection; USE AS DIRECTED  -     Ambulatory referral to Oncology/Hematology Adult  -     methylPREDNISolone (MEDROL DOSEPACK) 4 mg tablet; TAKE ACCORDING TO PACKAGE INSTRUCTIONS  -     HM2(CBC w/o Differential); Future  -     Comprehensive Metabolic Panel; Future  -     Vitamin D, Total (25-Hydroxy); Future  -     Lipid Cascade; Future  -     Thyroid Stimulating Hormone (TSH)  -     HM2(CBC w/o Differential)  -     Comprehensive Metabolic Panel  -     Vitamin D, Total (25-Hydroxy)  -     Lipid Cascade    Hyperlipemia  -     EPINEPHrine (EPIPEN 2-DWAINE) 0.3 mg/0.3 mL injection; USE AS DIRECTED  -     atorvastatin (LIPITOR) 20 MG tablet; Take 1 tablet (20 mg total) by mouth at bedtime.  -     Ambulatory referral to Oncology/Hematology Adult  -     methylPREDNISolone (MEDROL DOSEPACK) 4 mg tablet; TAKE ACCORDING TO PACKAGE INSTRUCTIONS  -     HM2(CBC w/o Differential); Future  -     Comprehensive Metabolic Panel; Future  -     Vitamin D, Total (25-Hydroxy); Future  -     Lipid Cascade; Future  -     Thyroid Stimulating Hormone (TSH)  -     HM2(CBC w/o Differential)  -     Comprehensive Metabolic Panel  -     Vitamin D, Total (25-Hydroxy)  -     Lipid Cascade    GERD (gastroesophageal reflux disease)  -     EPINEPHrine (EPIPEN 2-DWAINE) 0.3 mg/0.3 mL injection; USE AS DIRECTED  -     omeprazole (PRILOSEC) 20 MG capsule; TAKE 1 CAPSULE (20 MG TOTAL) BY MOUTH EVERY MORNING BEFORE BREAKFAST  -     Ambulatory referral to Oncology/Hematology Adult  -     methylPREDNISolone (MEDROL DOSEPACK) 4 mg tablet; TAKE ACCORDING TO PACKAGE INSTRUCTIONS  -     HM2(CBC w/o Differential); Future  -     Comprehensive Metabolic Panel; Future  -     Vitamin D, Total (25-Hydroxy); Future  -     Lipid Cascade; Future  -     Thyroid Stimulating Hormone (TSH)  -     HM2(CBC w/o Differential)  -     Comprehensive Metabolic Panel  -     Vitamin D, Total  (25-Hydroxy)  -     Lipid Cascade    Chronic Reflux Esophagitis  -     HM2(CBC w/o Differential); Future  -     Comprehensive Metabolic Panel; Future  -     Vitamin D, Total (25-Hydroxy); Future  -     Lipid Cascade; Future  -     Thyroid Stimulating Hormone (TSH)  -     HM2(CBC w/o Differential)  -     Comprehensive Metabolic Panel  -     Vitamin D, Total (25-Hydroxy)  -     Lipid Cascade    Hypercholesteremia  -     HM2(CBC w/o Differential); Future  -     Comprehensive Metabolic Panel; Future  -     Vitamin D, Total (25-Hydroxy); Future  -     Lipid Cascade; Future  -     Thyroid Stimulating Hormone (TSH)  -     HM2(CBC w/o Differential)  -     Comprehensive Metabolic Panel  -     Vitamin D, Total (25-Hydroxy)  -     Lipid Cascade    Nonulcer dyspepsia  -     HM2(CBC w/o Differential); Future  -     Comprehensive Metabolic Panel; Future  -     Vitamin D, Total (25-Hydroxy); Future  -     Lipid Cascade; Future  -     Thyroid Stimulating Hormone (TSH)  -     HM2(CBC w/o Differential)  -     Comprehensive Metabolic Panel  -     Vitamin D, Total (25-Hydroxy)  -     Lipid Cascade    Vitamin D deficiency  -     HM2(CBC w/o Differential); Future  -     Comprehensive Metabolic Panel; Future  -     Vitamin D, Total (25-Hydroxy); Future  -     Lipid Cascade; Future  -     Thyroid Stimulating Hormone (TSH)  -     HM2(CBC w/o Differential)  -     Comprehensive Metabolic Panel  -     Vitamin D, Total (25-Hydroxy)  -     Lipid Cascade    Other orders  -     Influenza, Recombinant, Inj, Quadrivalent, PF, 18+YRS          Time: total time spent with the patient was 25 minutes of which >50% was spent in counseling and coordination of care        Allergies   Allergen Reactions     Meperidine      Morphine (Pf)        Medications :  Prior to Admission medications    Medication Sig Start Date End Date Taking? Authorizing Provider   methylPREDNISolone (MEDROL DOSEPACK) 4 mg tablet TAKE ACCORDING TO PACKAGE INSTRUCTIONS 10/22/19  Yes  Earl Gutierrez MD   atorvastatin (LIPITOR) 20 MG tablet Take 1 tablet (20 mg total) by mouth at bedtime. 10/22/19   Earl Gutierrez MD   EPINEPHrine (EPIPEN 2-DWAINE) 0.3 mg/0.3 mL injection USE AS DIRECTED 10/22/19   Earl Gutierrez MD   omeprazole (PRILOSEC) 20 MG capsule TAKE 1 CAPSULE (20 MG TOTAL) BY MOUTH EVERY MORNING BEFORE BREAKFAST 10/22/19   Earl Gutierrez MD        Past Medical History: No past medical history on file.    Past Surgical History:   Past Surgical History:   Procedure Laterality Date     BREAST BIOPSY Right 1994    benign     HYSTERECTOMY  2012     OOPHORECTOMY Bilateral 2012     [place] TOTAL ABDOM HYSTERECTOMY      Description: Total Abdominal Hysterectomy;  Recorded: 04/02/2012;       Social History:   Social History     Socioeconomic History     Marital status:      Spouse name: Not on file     Number of children: Not on file     Years of education: Not on file     Highest education level: Not on file   Occupational History     Not on file   Social Needs     Financial resource strain: Not on file     Food insecurity:     Worry: Not on file     Inability: Not on file     Transportation needs:     Medical: Not on file     Non-medical: Not on file   Tobacco Use     Smoking status: Never Smoker     Smokeless tobacco: Never Used   Substance and Sexual Activity     Alcohol use: Not on file     Drug use: Not on file     Sexual activity: Not on file   Lifestyle     Physical activity:     Days per week: Not on file     Minutes per session: Not on file     Stress: Not on file   Relationships     Social connections:     Talks on phone: Not on file     Gets together: Not on file     Attends Orthodox service: Not on file     Active member of club or organization: Not on file     Attends meetings of clubs or organizations: Not on file     Relationship status: Not on file     Intimate partner violence:     Fear of current or ex partner: Not on file     Emotionally abused: Not on file      Physically abused: Not on file     Forced sexual activity: Not on file   Other Topics Concern     Not on file   Social History Narrative     Not on file       Family History:   Family History   Problem Relation Age of Onset     Breast cancer Neg Hx          Earl Gutierrez MD

## 2021-06-03 VITALS
BODY MASS INDEX: 30.2 KG/M2 | HEIGHT: 62 IN | OXYGEN SATURATION: 96 % | SYSTOLIC BLOOD PRESSURE: 143 MMHG | TEMPERATURE: 98.6 F | HEART RATE: 73 BPM | DIASTOLIC BLOOD PRESSURE: 77 MMHG | WEIGHT: 164.1 LBS

## 2021-06-03 VITALS
HEIGHT: 63 IN | DIASTOLIC BLOOD PRESSURE: 80 MMHG | SYSTOLIC BLOOD PRESSURE: 118 MMHG | BODY MASS INDEX: 28.71 KG/M2 | OXYGEN SATURATION: 99 % | WEIGHT: 162.04 LBS | HEART RATE: 62 BPM

## 2021-06-03 NOTE — CONSULTS
Consults   [facility] Hematology and Oncology Consult Note    Patient: Nena Huerta  MRN: [id number]  Date of Service: 11/11/2019  Requesting provider: Dr. Earl Gutierrez MD.      Reason for Visit     1. Hyperlipemia     2. GERD (gastroesophageal reflux disease)     3. Mycosis fungoides, extranodal and solid organ sites (H)           Assessment     1.  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  2.  Somewhat perplexing weight loss about a year ago of 20 pounds which has stabilized and in fact she is starting to gain some weight back.  3.  Some hot flashes which got misinterpreted as night sweats.  4.  Good general health otherwise.  5.  Good response to topical steroids.  6.  Slight anxiety.    Plan     1.  Reassured the patient that she is getting the right treatment for the cutaneous stage of cutaneous T-cell lymphoma/mycosis fungoides.  2.  No evidence of any B symptoms  3.  No note need for any scans.  4.  Reassured the patient and advised that in case she starts to lose some weight or starts to show lymph nodes that were the time to do a CT scan and then more work-up.  5.  Continue follow-up with dermatology as scheduled.  6.  Follow-up with me on as-needed basis.    Staging History     Cancer Staging  No matching staging information was found for the patient.    History     Patient is a very pleasant 59-year-old woman who has been referred to me by Dr. Earl Gutierrez MD for evaluation of cutaneous T-cell lymphoma/mycosis fungoides.  The patient was diagnosed to have this condition in July 2019 when she presented with some rash located on her right flank area measuring about the size of her palm of her hand.  This was slightly itchy but otherwise negative.  Slightly reddish raised maculopapular rash.  She did not think much of it.  Went and saw dermatologist.  We will initially do biopsy and was nondiagnostic.  However the last persisted so she had another biopsy done which was then reviewed at a  referral institution and that was read out as mycosis fungoides/cutaneous T-cell lymphoma.  Therefore the patient was then referred to Dr. Janis Dia at [facility].  She is getting treated there by the dermatology team.  She has been prescribed some topical steroids which has effectively made the lesion disappeared.    The patient did have a history of 20 pound weight loss about a year ago which was unintentional.  She also thought she was having drenching night sweats.  Therefore the reason of referral.  There was a concern that she may be having tumor stage of this lymphoma.    Currently however the patient has not lost any weight over the last year.  In fact she has gained about 6 to 7 pounds in the last few months.  The sweats that she had having is actually more like hot flashes.  She is having little sweating.  Overall feels good.    Past Medical History     Past Medical History:   Diagnosis Date     Lymphoma (H)        Past Social History     Social History     Socioeconomic History     Marital status:      Spouse name: Not on file     Number of children: 3     Years of education: Not on file     Highest education level: Not on file   Occupational History     Occupation: Homemaker   Social Needs     Financial resource strain: Not on file     Food insecurity:     Worry: Not on file     Inability: Not on file     Transportation needs:     Medical: Not on file     Non-medical: Not on file   Tobacco Use     Smoking status: Never Smoker     Smokeless tobacco: Never Used   Substance and Sexual Activity     Alcohol use: Yes     Alcohol/week: 1.0 - 2.0 standard drinks     Types: 1 - 2 Shots of liquor per week     Drug use: Never     Sexual activity: Never   Lifestyle     Physical activity:     Days per week: Not on file     Minutes per session: Not on file     Stress: Not on file   Relationships     Social connections:     Talks on phone: Not on file     Gets together: Not on file     Attends  Sabianism service: Not on file     Active member of club or organization: Not on file     Attends meetings of clubs or organizations: Not on file     Relationship status: Not on file     Intimate partner violence:     Fear of current or ex partner: Not on file     Emotionally abused: Not on file     Physically abused: Not on file     Forced sexual activity: Not on file   Other Topics Concern     Not on file   Social History Narrative     Not on file       Family History     Family History   Problem Relation Age of Onset     Heart disease Mother      COPD Mother      No Medical Problems Father      No Medical Problems Sister      Lung cancer Maternal Uncle 70     Cancer Maternal Uncle      Pancreatic cancer Paternal Grandmother 80     Cancer Paternal Grandmother      No Medical Problems Sister      No Medical Problems Son      No Medical Problems Daughter      No Medical Problems Daughter      Cancer Paternal cousin      Lymphoma Paternal cousin 40     Cancer Paternal cousin      Kidney cancer Paternal cousin 50     Stroke Paternal cousin      Breast cancer Neg Hx        Medication List     Prior to Admission medications    Medication Sig Start Date End Date Taking? Authorizing Provider   ascorbic acid, vitamin C, (ASCORBIC ACID WITH SANJANA HIPS) 500 MG tablet Take 500 mg by mouth daily.   Yes PROVIDER, HISTORICAL   atorvastatin (LIPITOR) 20 MG tablet Take 1 tablet (20 mg total) by mouth at bedtime. 10/22/19  Yes Earl Gutierrez MD   calcium carb/vit D3/minerals (CALTRATE 600+D PLUS MINERALS ORAL) Take 1 tablet by mouth daily.   Yes PROVIDER, HISTORICAL   cholecalciferol, vitamin D3, (VITAMIN D3) 1,000 unit capsule Take 2,000 Units by mouth daily.   Yes PROVIDER, HISTORICAL   COQ10, UBIQUINOL, ORAL Take 1 tablet by mouth daily.   Yes PROVIDER, HISTORICAL   glucosamine-chondroitin 500-400 mg tablet Take 1 tablet by mouth daily.   Yes PROVIDER, HISTORICAL   MULTIVITAMIN ORAL Take 1 tablet by mouth daily.   Yes PROVIDER,  HISTORICAL   omega-3/dha/epa/fish oil (FISH OIL-OMEGA-3 FATTY ACIDS) 300-1,000 mg capsule Take 2 g by mouth daily.   Yes PROVIDER, HISTORICAL   omeprazole (PRILOSEC) 20 MG capsule TAKE 1 CAPSULE (20 MG TOTAL) BY MOUTH EVERY MORNING BEFORE BREAKFAST 10/22/19  Yes Earl Gutierrez MD   EPINEPHrine (EPIPEN 2-DWAINE) 0.3 mg/0.3 mL injection USE AS DIRECTED 10/22/19   Earl Gutierrez MD   methylPREDNISolone (MEDROL DOSEPACK) 4 mg tablet TAKE ACCORDING TO PACKAGE INSTRUCTIONS 10/22/19   Earl Gutierrez MD   triamcinolone (KENALOG) 0.1 % ointment as needed. 10/21/19   PROVIDER, HISTORICAL       Allergies     Allergies   Allergen Reactions     Bee Pollen Other (See Comments)     Passed out per patient     Avocado Nausea And Vomiting     Meperidine      Morphine (Pf)        Review of Systems   A comprehensive review of 14 systems was done. Pertinent findings noted here and in history of present illness. All the rest negative.     General  General (WDL): Exceptions to WDL  Weight Loss: Yes - Recent (Greater than 3 months)(20 lbs a year ago)  ENT  ENT (WDL): All ENT elements are within defined limits  Glasses or Contacts: Yes - Chronic (Greater than 3 months)  Respiratory  Respiratory (WDL): All respiratory elements are within defined limits  Cardiovascular  Cardiovascular (WDL): All cardiovascular elements are within defined limits  Endocrine  Endocrine (WDL): Exceptions to WDL  Hotflashes: Yes- Chronic (Greater than 3 months)  Gastrointestinal  Gastrointestinal (WDL): Exceptions to WDL  Heartburn: Yes - Chronic (Greater than 3 months)  Musculoskeletal  Musculoskeletal (WDL): Exceptions to WDL  Joint pain: Yes - Chronic (Greater than 3 months)  Back Pain: Yes - Chronic (Greater than 3 months)  Muscle pain or stiffness: Yes - Chronic (Greater than 3 months)  Neurological  Neurological (WDL): Exceptions to WDL  Dominant Hand: Right  Numbness and/or tingling: Yes - Chronic (Greater than 3  "months)(hands)  Psychological/Emotional  Psychological/Emotional (WDL): All psychological/emotional elements are within defined limits  Hematological/Lymphatic  Hematological/Lymphatic (WDL): All hematological/lymphatic elements are within defined limits  Dermatological  Dermatologic (WDL): Exceptions to WDL  Rash : Yes - Chronic (Greater than 3 months)  Genitourinary/Reproductive  Genitourinary/Reproductive (WDL): All genitourinary/reproductive elements are within defined limits  Reproductive (Females only)     Pain  Currently in Pain: No/denies    Physical Exam     Recent Vitals 11/11/2019   Height 5' 2.25\"   Weight 164 lbs 2 oz   BSA (m2) 1.81 m2   /77   Pulse 73   Temp 98.6   Temp src 1   SpO2 96       Constitutional: Oriented to person, place, and time and appears well-developed.   HEENT:  Normocephalic and atraumatic.  Eyes: Conjunctivae and EOM are normal. Pupils are equal, round, and reactive to light. No discharge.  No scleral icterus. Nose normal. Mouth/Throat: Oropharynx is clear and moist. No oropharyngeal exudate.    NECK: Normal range of motion. Neck supple. No JVD present. No tracheal deviation present. No thyromegaly present.   CARDIOVASCULAR: Normal rate, regular rhythm and intact distal pulses.  Exam reveals no gallop and no friction rub.  Systolic murmur present.  PULMONARY: Effort normal and breath sounds normal. No respiratory distress.No Wheezing or rales.  ABDOMEN: Soft. Bowel sounds are normal. No distension and no mass. There is no tenderness. There is no rebound and no guarding. No HSM.  MUSCULOSKELETAL: Normal range of motion. No edema and no tenderness. Mild kyphosis, no tenderness.  LYMPH NODES: Has no cervical, supraclavicular, axillary and groin adenopathy.   NEUROLOGICAL: Alert and oriented to person, place, and time. No cranial nerve deficit.  Normal muscle tone. Coordination normal.   GENITOURINARY: Deferred exam.  SKIN: Detailed skin examination was done.  She has a very " faint rash on her right flank.  Barely visible.  Skin is warm and dry. No erythema. No pallor.   EXTREMITIES: No cyanosis, no clubbing, no edema. No Deformity.  PSYCHIATRIC: Normal mood, affect and behavior.      Lab Results     I reviewed her path report that she had from dermatology clinic which does confirm the presence of mycosis fungoides.  Could not find if it was positive for CD30 or not.    She had another skin biopsy which was also read as negative for melanoma.      Lab Results   Component Value Date    ALBUMIN 4.1 10/22/2019    ALT 22 10/22/2019    AST 16 10/22/2019    BUN 14 10/22/2019    CALCIUM 10.4 10/22/2019     10/22/2019    CHOL 211 (H) 10/22/2019    CO2 29 10/22/2019    CREATININE 0.68 10/22/2019    GFRAA >60 10/22/2019    GFRNONAA >60 10/22/2019    GLU 95 10/22/2019    HCT 46.4 10/22/2019    WBC 4.9 10/22/2019    HGB 15.7 10/22/2019     10/22/2019    K 4.7 10/22/2019     10/22/2019         Imaging Results     Mammo Screening Bilateral    Result Date: 10/28/2019  BILATERAL FULL FIELD DIGITAL SCREENING MAMMOGRAM WITH TOMOSYNTHESIS Performed on: 10/28/19. Compared to: 09/21/2018 Mammo Screening Bilateral, 09/21/2017 Mammo Screening Bilateral, and 09/20/2016 Mammo Screening Bilateral Findings: The breasts have scattered areas of fibroglandular densities. There is no radiographic evidence of malignancy. This study was evaluated with the assistance of Computer-Aided Detection. Breast Tomosynthesis was used in interpretation. Repeat routine screening mammogram in one year is recommended. ACR BI-RADS Category 1: Negative          Total time spent was 60 minutes, more than half of it was in face-to-face counseling regarding disease state, review of available images, laboratory values, pathological reports, treatment, options, their side effects and management.    Ish Tony MD

## 2021-06-04 VITALS
OXYGEN SATURATION: 97 % | WEIGHT: 167 LBS | HEIGHT: 62 IN | DIASTOLIC BLOOD PRESSURE: 88 MMHG | SYSTOLIC BLOOD PRESSURE: 120 MMHG | HEART RATE: 60 BPM | BODY MASS INDEX: 30.73 KG/M2

## 2021-06-05 VITALS
HEIGHT: 63 IN | WEIGHT: 156 LBS | BODY MASS INDEX: 27.64 KG/M2 | OXYGEN SATURATION: 99 % | HEART RATE: 64 BPM | DIASTOLIC BLOOD PRESSURE: 70 MMHG | SYSTOLIC BLOOD PRESSURE: 126 MMHG

## 2021-06-06 NOTE — PROGRESS NOTES
Office Visit   Nena Huerta   59 y.o. female    Date of Visit: 2/11/2020    Chief Complaint   Patient presents with     Get established visit        Assessment and Plan   1. Hyperlipemia  She tolerates the atorvastatin.  We will continue with the medication.  She will be due for recheck of her lipids next fall and will plan to see her for a physical at that time.  - atorvastatin (LIPITOR) 20 MG tablet; Take 1 tablet (20 mg total) by mouth at bedtime.  Dispense: 90 tablet; Refill: 3    2. GERD (gastroesophageal reflux disease)  This has been very helpful with her acid reflux symptoms.  We did discuss other options as far as taking Zantac or Pepcid.  She is going to consider if she would want to switch.  - omeprazole (PRILOSEC) 20 MG capsule; TAKE 1 CAPSULE (20 MG TOTAL) BY MOUTH EVERY MORNING BEFORE BREAKFAST  Dispense: 90 capsule; Refill: 3    3. Low back pain, unspecified back pain laterality, unspecified chronicity, unspecified whether sciatica present  She had an unusual episode of bilateral leg numbness that lasted about 30 minutes.  I am going to get an x-ray of her low back and make sure there is no spine abnormalities.  If she starts to have more episodes or more numbness and tingling she will let me know.  - XR Lumbar Spine 2 or 3 VWS       Return in about 7 months (around 9/11/2020) for Annual physical.     History of Present Illness   This 59 y.o. old female comes in to establish care.  She has a history of hyperlipidemia and GERD.  She is on atorvastatin and omeprazole.  She also has a history of cutaneous T-cell lymphoma and follows closely with dermatology.  This is been stable and she is not on any chronic medication other than topical steroid cream.  She has been feeling well and is up-to-date on age-appropriate health maintenance.  She sees gynecology for Pap smears.  She does note that about 3 days ago she developed numbness of both of her legs.  It was mostly in the buttock area.  It lasted  about 30 minutes and then went away.  She did not have any back pain or weakness and no other new symptoms.  She has no other acute concerns today.      Review of Systems: As above, systems otherwise reviewed and negative.     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       atorvastatin (LIPITOR) 20 MG tablet Take 1 tablet (20 mg total) by mouth at bedtime. 90 tablet 3     calcium carb/vit D3/minerals (CALTRATE 600+D PLUS MINERALS ORAL) Take 1 tablet by mouth daily.       cholecalciferol, vitamin D3, (VITAMIN D3) 1,000 unit capsule Take 2,000 Units by mouth daily.       COQ10, UBIQUINOL, ORAL Take 1 tablet by mouth daily.       glucosamine-chondroitin 500-400 mg tablet Take 1 tablet by mouth daily.       MULTIVITAMIN ORAL Take 1 tablet by mouth daily.       omega-3/dha/epa/fish oil (FISH OIL-OMEGA-3 FATTY ACIDS) 300-1,000 mg capsule Take 2 g by mouth daily.       omeprazole (PRILOSEC) 20 MG capsule TAKE 1 CAPSULE (20 MG TOTAL) BY MOUTH EVERY MORNING BEFORE BREAKFAST 90 capsule 3     triamcinolone (KENALOG) 0.1 % ointment as needed.  2     EPINEPHrine (EPIPEN 2-DWAINE) 0.3 mg/0.3 mL injection USE AS DIRECTED 1 Pre-filled Pen Syringe 12     methylPREDNISolone (MEDROL DOSEPACK) 4 mg tablet TAKE ACCORDING TO PACKAGE INSTRUCTIONS 21 tablet 0     No current facility-administered medications for this visit.      Allergies   Allergen Reactions     Bee Pollen Other (See Comments)     Passed out per patient     Avocado Nausea And Vomiting     Meperidine      Morphine (Pf)        "    Physical Exam     /88 (Patient Site: Right Arm, Patient Position: Sitting)   Pulse 60   Ht 5' 2.25\" (1.581 m)   Wt 167 lb (75.8 kg)   LMP 08/12/2012   SpO2 97%   BMI 30.30 kg/m      General: This is an alert female in no apparent distress.     Additional Information   Social History     Tobacco Use     Smoking status: Never Smoker     Smokeless tobacco: Never Used   Substance Use Topics     Alcohol use: Yes     Alcohol/week: 1.0 - 2.0 standard drinks     Types: 1 - 2 Shots of liquor per week     Drug use: Never          Arlyn Valenzuela MD    "

## 2021-06-08 NOTE — PROGRESS NOTES
"Office Visit - Follow up    Nena Huerta   56 y.o. female    Date of Visit: 2/2/2017    Chief Complaint   Patient presents with     Follow-up     Lab results       Subjective: Nena is here for regular follow-up of multiple concerns.  She is overdue for a physical exam and this will be scheduled later this spring.    Recent labs ordered.  LDL is significantly higher at 150.  She has been taking atorvastatin 20 mg daily and wean we had increase this dosage she did develop some myalgias.  Previous LDLs on this dose of atorvastatin have been in the range of 100.  She notes she has not changed her diet and we had a discussion about this.  Options include repeating levels in a few months or considering rosuvastatin however would prefer to repeat levels when she comes in for her physical    Remaining labs were negative    History of intermittent reflux and nonulcer dyspepsia stable and asymptomatic    ROS: A comprehensive review of systems was performed and was otherwise negative except as mentioned above.     Exam  Brief exam negative and unchanged     Visit Vitals     /80     Pulse 72     Ht 5' 3\" (1.6 m)     Wt 178 lb (80.7 kg)     LMP 08/12/2012     BMI 31.53 kg/m2       Assessment and Plan   Hyperlipidemia with LDL higher than desired see notes above    Chronic reflux and nonulcer dyspepsia stable    Physical to be scheduled later this year    Nena was seen today for follow-up.    Diagnoses and all orders for this visit:    Chronic Reflux Esophagitis    Hyperlipemia  -     atorvastatin (LIPITOR) 20 MG tablet; TAKE ONE TABLET BY MOUTH EVERY NIGHT AT BEDTIME    Vitamin D deficiency    Hypercholesteremia    Other orders  -     omeprazole (PRILOSEC) 20 MG capsule; Take 1 capsule (20 mg total) by mouth Daily before breakfast. TAKE ONE CAPSULE BY MOUTH DAILY  -     methylPREDNISolone (MEDROL DOSEPACK) 4 mg tablet; TAKE ACCORDING TO PACKAGE INSTRUCTIONS  -     EPINEPHrine (EPIPEN 2-DWAINE) 0.3 mg/0.3 mL atIn; USE AS " DIRECTED          Time: total time spent with the patient was 15 minutes of which >50% was spent in counseling and coordination of care        Allergies   Allergen Reactions     Meperidine      Morphine (Pf)        Medications :  Prior to Admission medications    Medication Sig Start Date End Date Taking? Authorizing Provider   atorvastatin (LIPITOR) 20 MG tablet TAKE ONE TABLET BY MOUTH EVERY NIGHT AT BEDTIME 2/2/17  Yes Earl Gutierrez MD   omeprazole (PRILOSEC) 20 MG capsule Take 1 capsule (20 mg total) by mouth Daily before breakfast. TAKE ONE CAPSULE BY MOUTH DAILY 2/2/17  Yes Earl Gutierrez MD   atorvastatin (LIPITOR) 20 MG tablet TAKE ONE TABLET BY MOUTH EVERY NIGHT AT BEDTIME 3/7/16 2/2/17 Yes Tr Garibay MD   omeprazole (PRILOSEC) 20 MG capsule TAKE ONE CAPSULE BY MOUTH TWO TIMES A DAY  Patient taking differently: Take 20 mg by mouth Daily before breakfast. TAKE ONE CAPSULE BY MOUTH DAILY 8/17/16 2/2/17 Yes Earl Gutierrez MD   EPINEPHrine (EPIPEN 2-DWAINE) 0.3 mg/0.3 mL atIn USE AS DIRECTED 2/2/17   Earl Gutierrez MD   methylPREDNISolone (MEDROL DOSEPACK) 4 mg tablet TAKE ACCORDING TO PACKAGE INSTRUCTIONS 2/2/17   Earl Gutierrez MD   EPIPEN 2-DWAINE 0.3 mg/0.3 mL atIn USE AS DIRECTED 6/28/16 2/2/17  Earl Gutierrez MD   methylPREDNISolone (MEDROL DOSEPACK) 4 mg tablet TAKE ACCORDING TO PACKAGE INSTRUCTIONS 6/28/16 2/2/17  Earl Gutierrez MD        Past Medical History: No past medical history on file.    Past Surgical History:   Past Surgical History:   Procedure Laterality Date     BREAST BIOPSY Right 1994    benign     HYSTERECTOMY  2012     OOPHORECTOMY Bilateral 2012     CO TOTAL ABDOM HYSTERECTOMY      Description: Total Abdominal Hysterectomy;  Recorded: 04/02/2012;       Social History:   Social History     Social History     Marital status:      Spouse name: N/A     Number of children: N/A     Years of education: N/A     Occupational History     Not on file.     Social History Main  Topics     Smoking status: Never Smoker     Smokeless tobacco: Not on file     Alcohol use Not on file     Drug use: Not on file     Sexual activity: Not on file     Other Topics Concern     Not on file     Social History Narrative       Family History:   Family History   Problem Relation Age of Onset     BRCA 1/2 Neg Hx      Breast cancer Neg Hx      Cancer Neg Hx      Colon cancer Neg Hx      Endometrial cancer Neg Hx      Ovarian cancer Neg Hx          Earl Gutierrez MD

## 2021-06-12 NOTE — TELEPHONE ENCOUNTER
Patient states the surgeon is aware of her last appointment date and said an addendum is fine. She is having surgery at the [facility]. She states they will be reaching out to us with the fax number.     Ani Williamson, CMA

## 2021-06-12 NOTE — TELEPHONE ENCOUNTER
I put an addendum on her physical.  Not all surgeons will accept that so she'll want to be sure.  Thanks.

## 2021-06-12 NOTE — TELEPHONE ENCOUNTER
Physical from 10/06/2020 with addendum faxed to below number. Patient notified.     Ani Williamson, CMA

## 2021-06-12 NOTE — TELEPHONE ENCOUNTER
Who is calling:  Patient is calling.  Reason for Call:  Patient has the fax number to fax the Pre-op physical to.  The fax number is:  [phone].  [facility].  Date of last appointment with primary care: 10/06/20  Okay to leave a detailed message: Yes. Please call the patient to verify.

## 2021-06-12 NOTE — TELEPHONE ENCOUNTER
Who is calling:  Patient   Reason for Call:  Caller stated that she is having thump surgery on 11/06 and is needing to see if Arlyn Valenzuela MD is okay with making a addendum on her last visit on 10/06 since she was just seen.   Date of last appointment with primary care:   Okay to leave a detailed message: Yes

## 2021-06-12 NOTE — PROGRESS NOTES
Office Visit - Physical   Nena Huerta   59 y.o.  female    Date of visit: 10/6/2020  Physician: Arlyn Valenzuela MD     Assessment and Plan   1. Routine general medical examination at a health care facility  She has a normal examination today.  She is scheduled for her mammogram.  She got a flu shot today.  She does not need Pap smears as she has had a hysterectomy.  Blood pressure is satisfactory.  She has not had any recent chest pain or palpitations.  She is up-to-date on colon cancer screening.  She is otherwise up-to-date on age-appropriate health maintenance.    2. GERD (gastroesophageal reflux disease)  - omeprazole (PRILOSEC) 20 MG capsule; TAKE 1 CAPSULE (20 MG TOTAL) BY MOUTH EVERY MORNING BEFORE BREAKFAST  Dispense: 90 capsule; Refill: 3  - HM1(CBC and Differential)  - Thyroid [place]  - Comprehensive Metabolic Panel    3. Hyperlipemia  - atorvastatin (LIPITOR) 20 MG tablet; Take 1 tablet (20 mg total) by mouth at bedtime.  Dispense: 90 tablet; Refill: 3  - Lipid Cascade  - Thyroid [place]  - Comprehensive Metabolic Panel      ADDENDUM 10/26/20:  She is scheduled for thumb surgery and has no contraindications for surgery.  No signs of unstable heart or lung disease and no symptoms of infection.  [facility]    Return in about 1 year (around 10/6/2021) for Routine preventive.     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     History of Present Illness   This 59 y.o. old female comes in for a physical exam.  She has a history of hyperlipidemia and T-cell lymphoma with skin concerns.  These have been well controlled.  She sees dermatology.  She is fasting today and due for blood work.  She has no acute concerns today.  She has not had any problems with chest pain or palpitations or other worrisome symptoms.  She is scheduled for a mammogram in November.  She does not need Pap smears.  She is up-to-date on colon cancer screening.  She did get a flu shot today.  She is otherwise up-to-date on age-appropriate health maintenance.    Review of Systems: A comprehensive review of systems was negative except as noted.     Medications and Allergies   Current Outpatient Medications   Medication Sig Dispense Refill     ascorbic acid, vitamin C, (ASCORBIC ACID WITH SANJANA HIPS) 500 MG tablet Take 500 mg by mouth daily.       atorvastatin (LIPITOR) 20 MG tablet Take 1 tablet (20 mg total) by mouth at bedtime. 90 tablet 3     calcium carb/vit D3/minerals (CALTRATE 600+D PLUS MINERALS ORAL) Take 1 tablet by mouth daily.       cholecalciferol, vitamin D3, (VITAMIN D3) 1,000 unit capsule Take 2,000 Units by mouth daily.       COQ10, UBIQUINOL, ORAL Take 1 tablet by mouth daily.       EPINEPHrine (EPIPEN 2-DWAINE) 0.3 mg/0.3 mL injection USE AS DIRECTED 1 Pre-filled Pen Syringe 12     glucosamine-chondroitin 500-400 mg tablet Take 1 tablet by mouth daily.       MULTIVITAMIN ORAL Take 1 tablet by mouth daily.       omega-3/dha/epa/fish oil (FISH OIL-OMEGA-3 FATTY ACIDS) 300-1,000 mg capsule Take 2 g by mouth daily.       omeprazole (PRILOSEC) 20 MG capsule TAKE 1 CAPSULE (20 MG TOTAL) BY MOUTH EVERY MORNING BEFORE BREAKFAST 90 capsule 3     triamcinolone (KENALOG) 0.1 % ointment as needed.  2     methylPREDNISolone (MEDROL DOSEPACK) 4 mg tablet TAKE ACCORDING TO PACKAGE INSTRUCTIONS 21 tablet 0     No  "current facility-administered medications for this visit.      Allergies   Allergen Reactions     Bee Pollen Other (See Comments)     Passed out per patient     Avocado Nausea And Vomiting     Meperidine      Morphine (Pf)         Family and Social History   Family History   Problem Relation Age of Onset     Heart disease Mother      COPD Mother      No Medical Problems Father      No Medical Problems Sister      Lung cancer Maternal Uncle 70     Cancer Maternal Uncle      Pancreatic cancer Paternal Grandmother 80     Cancer Paternal Grandmother      No Medical Problems Sister      No Medical Problems Son      No Medical Problems Daughter      No Medical Problems Daughter      Cancer Paternal cousin      Lymphoma Paternal cousin 40     Cancer Paternal cousin      Kidney cancer Paternal cousin 50     Stroke Paternal cousin      Breast cancer Neg Hx      Colon cancer Neg Hx         Social History     Tobacco Use     Smoking status: Never Smoker     Smokeless tobacco: Never Used   Substance Use Topics     Alcohol use: Yes     Alcohol/week: 1.0 - 2.0 standard drinks     Types: 1 - 2 Shots of liquor per week     Drug use: Never        Physical Exam   General Appearance:   This is an alert female, sitting comfortably, no apparent distress.    /70 (Patient Site: Right Arm, Patient Position: Sitting)   Pulse 64   Ht 5' 2.5\" (1.588 m)   Wt 156 lb (70.8 kg)   LMP 08/12/2012   SpO2 99%   BMI 28.08 kg/m      HEENT:  Normocephalic, atraumatic.  PERRL.  Tympanic membranes were normal with good light reflex.  Oropharynx with moist mucous membranes and no exudates.    NECK: Supple with no lymphadenopathy.  Thyroid was normal.  RESPIRATORY: Normal respiratory effort.  Lungs were clear to ausculation both anteriorly and posteriorly.  No wheezes or rales were detected.   CARDIOVASCULAR: Heart rate and rhythm were normal.  S1 and S2 were normal and there were no extra sounds or murmurs. Dorsalis pedis pulses were normal.  " Jugular venous pressure was normal.  There was no peripheral edema.  GASTROINTESTINAL:  Bowel sounds were present.  Abdomen was soft, nontender, nondistended, with no organomegaly detected.  No rebound or guarding.  MUSCULOSKELETAL: Skeletal configuration was normal and muscle mass was normal for age. Joint appearance was overall normal.  LYMPHATIC: There were no enlarged nodes.  SKIN/HAIR/NAILS: Skin was warm and well perfused.  NEUROLOGIC: The patient was alert and oriented to person, place, time, and circumstance. Speech was normal. Cranial nerves were normal. No focal defecits were noted.  PSYCHIATRIC:  Mood and affect were normal and the patient had normal recent and remote memory. The patient's judgment and insight were normal.  BREASTS: Normal breast tissue with no masses or abnormalities.     Additional Information        Arlyn Valenzuela MD  Internal Medicine  Contact me at [phone]

## 2021-06-19 NOTE — LETTER
Letter by Earl Gutierrez MD at      Author: Earl Gutierrez MD Service: -- Author Type: --    Filed:  Encounter Date: 10/24/2019 Status: Signed         Nena JOY Huerta  [address] [facility] [address]             October 24, 2019         Dear MsJuana Huerta,    Below are the results from your recent visit:    Resulted Orders   Thyroid Stimulating Hormone (TSH)   Result Value Ref Range    TSH 0.75 0.30 - 5.00 uIU/mL   HM2(CBC w/o Differential)   Result Value Ref Range    WBC 4.9 4.0 - 11.0 thou/uL    RBC 5.09 3.80 - 5.40 mill/uL    Hemoglobin 15.7 12.0 - 16.0 g/dL    Hematocrit 46.4 35.0 - 47.0 %    MCV 91 80 - 100 fL    MCH 30.8 27.0 - 34.0 pg    MCHC 33.8 32.0 - 36.0 g/dL    RDW 11.5 11.0 - 14.5 %    Platelets 302 140 - 440 thou/uL    MPV 7.6 7.0 - 10.0 fL   Comprehensive Metabolic Panel   Result Value Ref Range    Sodium 144 136 - 145 mmol/L    Potassium 4.7 3.5 - 5.0 mmol/L    Chloride 105 98 - 107 mmol/L    CO2 29 22 - 31 mmol/L    Anion Gap, Calculation 10 5 - 18 mmol/L    Glucose 95 70 - 125 mg/dL    BUN 14 8 - 22 mg/dL    Creatinine 0.68 0.60 - 1.10 mg/dL    GFR MDRD Af Amer >60 >60 mL/min/1.73m2    GFR MDRD Non Af Amer >60 >60 mL/min/1.73m2    Bilirubin, Total 0.8 0.0 - 1.0 mg/dL    Calcium 10.4 8.5 - 10.5 mg/dL    Protein, Total 7.1 6.0 - 8.0 g/dL    Albumin 4.1 3.5 - 5.0 g/dL    Alkaline Phosphatase 99 45 - 120 U/L    AST 16 0 - 40 U/L    ALT 22 0 - 45 U/L    Narrative    Fasting Glucose reference range is 70-99 mg/dL per  American Diabetes Association (ADA) guidelines.   Vitamin D, Total (25-Hydroxy)   Result Value Ref Range    Vitamin D, Total (25-Hydroxy) 49.2 30.0 - 80.0 ng/mL    Narrative    Deficiency <10.0 ng/mL  Insufficiency 10.0-29.9 ng/mL  Sufficiency 30.0-80.0 ng/mL  Toxicity (possible) >100.0 ng/mL   Lipid Cascade   Result Value Ref Range    Cholesterol 211 (H) <=199 mg/dL    Triglycerides 95 <=149 mg/dL    HDL Cholesterol 61 >=50 mg/dL    LDL Calculated 131 (H) <=129 mg/dL    Patient  Fasting > 8hrs? Yes        All labs look great.  Best of luck to you.  It has been a privilege to care for you and a maria antonia to get to know both you and    Please call with questions or contact us using [employer].    Sincerely,        Electronically signed by Earl Gutierrez MD

## 2021-06-19 NOTE — LETTER
Letter by Julieth Pinedo RN at      Author: Julieth Pinedo RN Service: -- Author Type: --    Filed:  Encounter Date: 10/23/2019 Status: Signed       Dear Nena Huerta    Thank you for choosing [facility] for your care.  We are committed to providing you with the highest quality and compassionate healthcare services.  The following information pertains to your first appointment with our clinic.    Date/Time of appointment: Monday, November 11th, 2019 arrival of 10:45 am please!    Note:  This allows time to complete forms, possible labs and nursing assessment.     Name of your Physician: Ish Tony MD    What to bring to your appointment:    Completed Patient History/Initial Nursing Assessment and Medication/Allergy List (these forms were sent to you).    Any paperwork or films from your physician that we have asked you to bring.    Your current insurance card(s).    Parking:    Please refer to the map included to direct you.  The [facility] is located at the [place] end of [facility] in [place], [place].      After turning onto [address] from [facility], take a right turn at the first stop sign.  We have designated parking on the left, identified as parking for Cancer Care patients (Lot D).     The Code to Enter Lot D is: 1101. This code changes monthly and will always coincide with the current month followed by 01. For example August will be 0801.  The month will continue to change but the 01 will remain constant.  If lot D is full please use Parking Lot A, directly across the street.    Please enter the Cancer Care Center on the north end of the [facility].  You will see a sign on the building.        For Medical Oncology or Hematology appointments, please take the elevator to the second floor to check in.   For Radiation Oncology appointments, please go straight through the double doors and check in.     Also please note appointments can last 1.5-2 hours.      We  hope these instructions are helpful to you.  If you have any questions or concerns, please call us at [phone].  It is our pleasure to assist you.    Warm Regards,  Julieth Pinedo  Nurse Navigator  [phone]

## 2021-06-20 NOTE — PROGRESS NOTES
"Office Visit - Follow up    Nena Huerta   57 y.o. female    Date of Visit: 10/9/2018    Chief Complaint   Patient presents with     Follow-up     Check up       Subjective: Here for regular follow-up of multiple concerns and for routine labs.  History of chest pain syndrome with vague left parasternal discomfort this tends to come and go.  It is sometimes associated with symptoms of reflux.  We have done previous evaluation I suggested a trial of antacids when they occur.  History of chronic dyspepsia she is on omeprazole and has done well.    Hyperlipidemia on atorvastatin follow-up labs pending    History of vitamin D deficiency we are rechecking labs.    Weight loss of 20 pounds noted.  She is somewhat surprised that the weight loss.  She did have a family wedding this summer and had been trying to lose weight although her weight loss is somewhat unexplained.    No other new concerns or complaints        ROS: A comprehensive review of systems was performed and was otherwise negative except as mentioned above.     Exam  Alert and oriented head and neck negative EENT negative lungs clear heart normal abdomen benign   /80  Pulse 66  Ht 5' 3\" (1.6 m)  Wt 158 lb (71.7 kg)  LMP 08/12/2012  BMI 27.99 kg/m2    Assessment and Plan  Regular follow-up labs ordered    I reassured her about her left chest discomfort if this changes she will call.  Weight loss noted we are checking routine labs continue to follow if weight continues to drop and is unexplained she will call no other changes will discuss labs when available    Nena was seen today for follow-up.    Diagnoses and all orders for this visit:    Hypercholesteremia  -     HM2(CBC w/o Differential); Future  -     Comprehensive Metabolic Panel; Future  -     Vitamin D, Total (25-Hydroxy); Future  -     Lipid Cascade; Future  -     Thyroid Stimulating Hormone (TSH)  -     HM2(CBC w/o Differential)  -     Comprehensive Metabolic Panel  -     Vitamin D, " Total (25-Hydroxy)  -     Lipid Cascade    Palpitations  -     HM2(CBC w/o Differential); Future  -     Comprehensive Metabolic Panel; Future  -     Vitamin D, Total (25-Hydroxy); Future  -     Lipid Cascade; Future  -     Thyroid Stimulating Hormone (TSH)  -     HM2(CBC w/o Differential)  -     Comprehensive Metabolic Panel  -     Vitamin D, Total (25-Hydroxy)  -     Lipid Cascade    Chest pain, unspecified  -     HM2(CBC w/o Differential); Future  -     Comprehensive Metabolic Panel; Future  -     Vitamin D, Total (25-Hydroxy); Future  -     Lipid Cascade; Future  -     Thyroid Stimulating Hormone (TSH)  -     HM2(CBC w/o Differential)  -     Comprehensive Metabolic Panel  -     Vitamin D, Total (25-Hydroxy)  -     Lipid Cascade    Chronic Reflux Esophagitis  -     HM2(CBC w/o Differential); Future  -     Comprehensive Metabolic Panel; Future  -     Vitamin D, Total (25-Hydroxy); Future  -     Lipid Cascade; Future  -     Thyroid Stimulating Hormone (TSH)  -     2(CBC w/o Differential)  -     Comprehensive Metabolic Panel  -     Vitamin D, Total (25-Hydroxy)  -     Lipid Cascade    Nonulcer dyspepsia  -     2(CBC w/o Differential); Future  -     Comprehensive Metabolic Panel; Future  -     Vitamin D, Total (25-Hydroxy); Future  -     Lipid Cascade; Future  -     Thyroid Stimulating Hormone (TSH)  -     HM2(CBC w/o Differential)  -     Comprehensive Metabolic Panel  -     Vitamin D, Total (25-Hydroxy)  -     Lipid Cascade    H/O: hysterectomy  -     HM2(CBC w/o Differential); Future  -     Comprehensive Metabolic Panel; Future  -     Vitamin D, Total (25-Hydroxy); Future  -     Lipid Cascade; Future  -     Thyroid Stimulating Hormone (TSH)  -     HM2(CBC w/o Differential)  -     Comprehensive Metabolic Panel  -     Vitamin D, Total (25-Hydroxy)  -     Lipid Cascade    Vitamin D deficiency  -     HM2(CBC w/o Differential); Future  -     Comprehensive Metabolic Panel; Future  -     Vitamin D, Total (25-Hydroxy);  Future  -     Lipid Cascade; Future  -     Thyroid Stimulating Hormone (TSH)  -     HM2(CBC w/o Differential)  -     Comprehensive Metabolic Panel  -     Vitamin D, Total (25-Hydroxy)  -     Lipid Cascade    Other orders  -     Influenza, Recombinant, Inj, Quadrivalent, PF, 18+YRS  -     Varicella Zoster, Recombinant Vaccine IM          Time: total time spent with the patient was 20 minutes of which >50% was spent in counseling and coordination of care        Allergies   Allergen Reactions     Meperidine      Morphine (Pf)        Medications :  Prior to Admission medications    Medication Sig Start Date End Date Taking? Authorizing Provider   atorvastatin (LIPITOR) 20 MG tablet Take 1 tablet (20 mg total) by mouth at bedtime. 7/26/18  Yes Earl Gutierrez MD   omeprazole (PRILOSEC) 20 MG capsule TAKE 1 CAPSULE (20 MG TOTAL) BY MOUTH EVERY MORNING BEFORE BREAKFAST 2/15/18  Yes Earl Gutierrez MD   EPINEPHrine (EPIPEN 2-DWAINE) 0.3 mg/0.3 mL atIn USE AS DIRECTED 2/2/17   Earl Gutierrez MD   methylPREDNISolone (MEDROL DOSEPACK) 4 mg tablet TAKE ACCORDING TO PACKAGE INSTRUCTIONS 2/2/17   Earl Gutierrez MD        Past Medical History: No past medical history on file.    Past Surgical History:   Past Surgical History:   Procedure Laterality Date     BREAST BIOPSY Right 1994    benign     HYSTERECTOMY  2012     OOPHORECTOMY Bilateral 2012     LA TOTAL ABDOM HYSTERECTOMY      Description: Total Abdominal Hysterectomy;  Recorded: 04/02/2012;       Social History:   Social History     Social History     Marital status:      Spouse name: N/A     Number of children: N/A     Years of education: N/A     Occupational History     Not on file.     Social History Main Topics     Smoking status: Never Smoker     Smokeless tobacco: Never Used     Alcohol use Not on file     Drug use: Not on file     Sexual activity: Not on file     Other Topics Concern     Not on file     Social History Narrative       Family History:   Family  History   Problem Relation Age of Onset     Breast cancer Neg Hx          Earl Gutierrez MD

## 2021-06-22 NOTE — PROGRESS NOTES
Immunizations     Name       ZOSTER, RECOMBINANT, IM       Patient in clinic today for 2nd Shingrix vaccine.  Patient tolerated vaccine well and had no further questions at this time.    Sera NEAL CMA/SHERRY....................11:20 AM

## 2021-07-13 ENCOUNTER — RECORDS - HEALTHEAST (OUTPATIENT)
Dept: ADMINISTRATIVE | Facility: CLINIC | Age: 61
End: 2021-07-13

## 2021-07-21 ENCOUNTER — TRANSFERRED RECORDS (OUTPATIENT)
Dept: HEALTH INFORMATION MANAGEMENT | Facility: CLINIC | Age: 61
End: 2021-07-21

## 2021-07-21 ENCOUNTER — RECORDS - HEALTHEAST (OUTPATIENT)
Dept: ADMINISTRATIVE | Facility: CLINIC | Age: 61
End: 2021-07-21

## 2021-07-22 ENCOUNTER — RECORDS - HEALTHEAST (OUTPATIENT)
Dept: SCHEDULING | Facility: CLINIC | Age: 61
End: 2021-07-22

## 2021-07-22 DIAGNOSIS — Z12.31 OTHER SCREENING MAMMOGRAM: ICD-10-CM

## 2021-09-11 ENCOUNTER — HEALTH MAINTENANCE LETTER (OUTPATIENT)
Age: 61
End: 2021-09-11

## 2021-10-06 ENCOUNTER — TRANSFERRED RECORDS (OUTPATIENT)
Dept: HEALTH INFORMATION MANAGEMENT | Facility: CLINIC | Age: 61
End: 2021-10-06
Payer: COMMERCIAL

## 2021-11-06 ENCOUNTER — HEALTH MAINTENANCE LETTER (OUTPATIENT)
Age: 61
End: 2021-11-06

## 2021-11-22 ENCOUNTER — OFFICE VISIT (OUTPATIENT)
Dept: DERMATOLOGY | Facility: CLINIC | Age: 61
End: 2021-11-22
Payer: COMMERCIAL

## 2021-11-22 DIAGNOSIS — C84.00 MYCOSIS FUNGOIDES, UNSPECIFIED BODY REGION (H): Primary | ICD-10-CM

## 2021-11-22 PROCEDURE — 99213 OFFICE O/P EST LOW 20 MIN: CPT | Mod: GC | Performed by: DERMATOLOGY

## 2021-11-22 ASSESSMENT — PAIN SCALES - GENERAL: PAINLEVEL: NO PAIN (0)

## 2021-11-22 NOTE — PATIENT INSTRUCTIONS
Continue the as needed topical triamcinolone for now. Next step would be light therapy (3x/week). This can be done with a local derm. If the rash worsens or you desire a more aggressive treatment, mychart and we can talk about starting lights.

## 2021-11-22 NOTE — NURSING NOTE
Chief Complaint   Patient presents with     Skin Check     CTCL     Pt states she has no specific areas of concerns.   Mariela Kline LPN on 11/22/2021 at 9:02 AM

## 2021-11-22 NOTE — PROGRESS NOTES
"[facility] Dermatology Note  Encounter Date: Nov 22, 2021  Office Visit     Dermatology Problem List:  1. CTCL, Stage 1A, bx 6/17/19 (read by [facility])  - CBC w/ diff, peripheral smear, LFT and LDH 7/30/19 within normal limits  - Tx: Triamcinolone 0.1% ointment BID  2. Hx of BCC on the right nare  3. Hx of \"pre-melanoma\" posterior right thigh  4. 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 Discussed next steps which would be nbUVB. Given extent, holding at present.  - Continue prn triamcinolone  - Holding on nbUVB at present     Procedures Performed:   None    Follow-up: 1 year(s) in-person, or earlier for new or changing lesions    Staff and Resident:     This patient was staffed with Dr. Valles.    Sony Albert MD  Internal Medicine - Dermatology PGY 4  I, Janis Valles MD, saw this patient with the resident and agree with the resident s findings and plan of care as documented in the resident s note.    ____________________________________________    CC: Skin Check (CTCL)    HPI:  Ms. Nena Huerta is a(n) 61 year old female who presents today as a return patient for CTCL. Last seen 11/2020 - stable and using triam prn at that time.  - Same areas of involvement; no major change  - Maybe a few spots on breast  - No b symptoms or significant itch  - Gets red and active  - Using minimal topical triamcinolone  - Recent CBC w/ PCP stable  Patient is otherwise feeling well, without additional skin concerns.    Labs Reviewed:  10/8/21 CBC    Physical Exam:  Vitals: There were no vitals taken for this visit.  GEN: Pleasant female, in NAD  SKIN: Focused examination of chest and abdomen was performed.  - Finely raised light pink patches and plaques on left " breast and right abdomen / chest  - No other lesions of concern on areas examined.     Medications:  Current Outpatient Medications   Medication     Ascorbic Acid (VITAMIN C PO)     atorvastatin (LIPITOR) 20 MG tablet     Calcium Carbonate-Vitamin D (CALTRATE 600+D PO)     Cholecalciferol (VITAMIN D3 PO)     coenzyme Q-10 10 MG CAPS     Coenzyme Q10 (COQ10 PO)     EPINEPHrine (EPIPEN/ADRENACLICK/OR ANY BX GENERIC EQUIV) 0.3 MG/0.3ML injection 2-pack     GLUCOSAMINE-CHONDROITIN PO     Multiple Vitamin (MULTIVITAMIN PO)     Multiple Vitamins-Minerals (CENTRUM VITAMINTS PO)     Omega-3 Fatty Acids (FISH OIL PO)     omeprazole (PRILOSEC) 20 MG DR capsule     triamcinolone (KENALOG) 0.1 % external ointment     No current facility-administered medications for this visit.      Past Medical History:   Patient Active Problem List   Diagnosis     Mycosis fungoides, unspecified body region (H)     Past Medical History:   Diagnosis Date     Basal cell carcinoma      Lymphoma (H)      Other disorder of menstruation and other abnormal bleeding from female genital tract     Created by Conversion        CC Referred Self, MD  No address on file on close of this encounter.

## 2021-11-22 NOTE — LETTER
"11/22/2021       RE: Nena Huerta  [address] [facility] [address]     Dear Colleague,    Thank you for referring your patient, Nena Huerta, to the [facility]MATOLOG[facility] [place] at [facility]. Please see a copy of my visit note below.    [facility] Dermatology Note  Encounter Date: Nov 22, 2021  Office Visit     Dermatology Problem List:  1. CTCL, Stage 1A, bx 6/17/19 (read by [facility])  - CBC w/ diff, peripheral smear, LFT and LDH 7/30/19 within normal limits  - Tx: Triamcinolone 0.1% ointment BID  2. Hx of BCC on the right nare  3. Hx of \"pre-melanoma\" posterior right thigh  4. 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 Discussed next steps which would be nbUVB. Given extent, holding at present.  - Continue prn triamcinolone  - Holding on nbUVB at present     Procedures Performed:   None    Follow-up: 1 year(s) in-person, or earlier for new or changing lesions    Staff and Resident:     This patient was staffed with Dr. Valles.    Sony Albert MD  Internal Medicine - Dermatology PGY 4  I, Janis Valles MD, saw this patient with the resident and agree with the resident s findings and plan of care as documented in the resident s note.    ____________________________________________    CC: Skin Check (CTCL)    HPI:  Ms. Nena Huerta is a(n) 61 year old female who presents today as a return patient for CTCL. Last seen 11/2020 - stable and using triam prn at that time.  - Same areas of involvement; no major change  - Maybe a few spots on breast  - No b symptoms or significant itch  - Gets red and active  - Using minimal topical triamcinolone  - Recent CBC w/ PCP " stable  Patient is otherwise feeling well, without additional skin concerns.    Labs Reviewed:  10/8/21 CBC    Physical Exam:  Vitals: There were no vitals taken for this visit.  GEN: Pleasant female, in NAD  SKIN: Focused examination of chest and abdomen was performed.  - Finely raised light pink patches and plaques on left breast and right abdomen / chest  - No other lesions of concern on areas examined.     Medications:  Current Outpatient Medications   Medication     Ascorbic Acid (VITAMIN C PO)     atorvastatin (LIPITOR) 20 MG tablet     Calcium Carbonate-Vitamin D (CALTRATE 600+D PO)     Cholecalciferol (VITAMIN D3 PO)     coenzyme Q-10 10 MG CAPS     Coenzyme Q10 (COQ10 PO)     EPINEPHrine (EPIPEN/ADRENACLICK/OR ANY BX GENERIC EQUIV) 0.3 MG/0.3ML injection 2-pack     GLUCOSAMINE-CHONDROITIN PO     Multiple Vitamin (MULTIVITAMIN PO)     Multiple Vitamins-Minerals (CENTRUM VITAMINTS PO)     Omega-3 Fatty Acids (FISH OIL PO)     omeprazole (PRILOSEC) 20 MG DR capsule     triamcinolone (KENALOG) 0.1 % external ointment     No current facility-administered medications for this visit.      Past Medical History:   Patient Active Problem List   Diagnosis     Mycosis fungoides, unspecified body region (H)     Past Medical History:   Diagnosis Date     Basal cell carcinoma      Lymphoma (H)      Other disorder of menstruation and other abnormal bleeding from female genital tract     Created by Conversion        CC Referred Self, MD  No address on file on close of this encounter.

## 2021-11-30 ENCOUNTER — TRANSFERRED RECORDS (OUTPATIENT)
Dept: HEALTH INFORMATION MANAGEMENT | Facility: CLINIC | Age: 61
End: 2021-11-30
Payer: COMMERCIAL

## 2021-12-01 ENCOUNTER — TRANSFERRED RECORDS (OUTPATIENT)
Dept: HEALTH INFORMATION MANAGEMENT | Facility: CLINIC | Age: 61
End: 2021-12-01
Payer: COMMERCIAL

## 2021-12-07 ENCOUNTER — TRANSFERRED RECORDS (OUTPATIENT)
Dept: HEALTH INFORMATION MANAGEMENT | Facility: CLINIC | Age: 61
End: 2021-12-07
Payer: COMMERCIAL

## 2022-01-04 ENCOUNTER — ANCILLARY PROCEDURE (OUTPATIENT)
Dept: MAMMOGRAPHY | Facility: CLINIC | Age: 62
End: 2022-01-04
Attending: INTERNAL MEDICINE
Payer: COMMERCIAL

## 2022-01-04 DIAGNOSIS — Z12.31 VISIT FOR SCREENING MAMMOGRAM: ICD-10-CM

## 2022-01-04 PROCEDURE — 77067 SCR MAMMO BI INCL CAD: CPT

## 2022-05-04 ENCOUNTER — TRANSFERRED RECORDS (OUTPATIENT)
Dept: HEALTH INFORMATION MANAGEMENT | Facility: CLINIC | Age: 62
End: 2022-05-04
Payer: COMMERCIAL

## 2022-05-11 ENCOUNTER — TRANSFERRED RECORDS (OUTPATIENT)
Dept: HEALTH INFORMATION MANAGEMENT | Facility: CLINIC | Age: 62
End: 2022-05-11
Payer: COMMERCIAL

## 2022-06-08 ENCOUNTER — TRANSFERRED RECORDS (OUTPATIENT)
Dept: HEALTH INFORMATION MANAGEMENT | Facility: CLINIC | Age: 62
End: 2022-06-08
Payer: COMMERCIAL

## 2022-10-29 ENCOUNTER — HEALTH MAINTENANCE LETTER (OUTPATIENT)
Age: 62
End: 2022-10-29

## 2022-11-08 ENCOUNTER — TRANSFERRED RECORDS (OUTPATIENT)
Dept: HEALTH INFORMATION MANAGEMENT | Facility: CLINIC | Age: 62
End: 2022-11-08

## 2022-11-22 ENCOUNTER — OFFICE VISIT (OUTPATIENT)
Dept: DERMATOLOGY | Facility: CLINIC | Age: 62
End: 2022-11-22
Payer: COMMERCIAL

## 2022-11-22 DIAGNOSIS — C84.00 MYCOSIS FUNGOIDES, UNSPECIFIED BODY REGION (H): Primary | ICD-10-CM

## 2022-11-22 PROCEDURE — 99213 OFFICE O/P EST LOW 20 MIN: CPT | Performed by: DERMATOLOGY

## 2022-11-22 RX ORDER — ATORVASTATIN CALCIUM 40 MG/1
40 TABLET, FILM COATED ORAL DAILY
COMMUNITY
Start: 2022-10-27

## 2022-11-22 ASSESSMENT — PAIN SCALES - GENERAL: PAINLEVEL: NO PAIN (0)

## 2022-11-22 NOTE — PROGRESS NOTES
"[facility] Dermatology Note  Encounter Date: Nov 22, 2022  Office Visit     Dermatology Problem List:  1. CTCL, Stage 1A, bx 6/17/19 (read by [facility])  - CBC w/ diff, peripheral smear, LFT and LDH 7/30/19 within normal limits  - Tx: Triamcinolone 0.1% ointment BID  2. Hx of BCC on the right nare  3. Hx of \"pre-melanoma\" posterior right thigh  4. 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 Follows with [facility] for yearly skin check    ____________________________________________    Assessment & Plan:     # CTCL stage IA- stable  - Continue triamcinolone 0.1% ointment as needed   - Recent CBC with primary reviewed in [facility] 10/06/22 ( just slightly low wbc, 4.1, with normal differential)  - Discussed warning signs for progression and reviewed the recent data from [place] about high throughput DNA sequencing and TCF.  Will continue to monitor      Follow-up: 1 year(s) in-person, or earlier for new or changing lesions    Staff:     Janis Valles MD  ____________________________________________    CC: Derm Problem (Follow up for T-Cells )    HPI:  Ms. Nena Huerta is a(n) 62 year old female who presents today as a return patient for CTCL.  Overall stable.  No increased skin activity.  Does note more itch in winter at night.  Weigh stable.  Does have some stable night sweats.  Has yearly CBC with primary MD with no concerns.  Has year skin check ( upcoming next week) with [facility].     Patient is otherwise feeling well, without additional skin concerns.     Labs Reviewed:  See CBC above    Physical Exam:  Vitals: There were no vitals taken for this visit.  SKIN: Focused examination of trunk and lower legs was performed.  - digitate red patches of flanks and pink annular patches of breasts  - No other lesions of concern on areas examined.     Medications:  Current Outpatient Medications   Medication     Ascorbic Acid (VITAMIN C PO)     atorvastatin " (LIPITOR) 40 MG tablet     Calcium Carbonate-Vitamin D (CALTRATE 600+D PO)     Cholecalciferol (VITAMIN D3 PO)     coenzyme Q-10 10 MG CAPS     Coenzyme Q10 (COQ10 PO)     EPINEPHrine (EPIPEN/ADRENACLICK/OR ANY BX GENERIC EQUIV) 0.3 MG/0.3ML injection 2-pack     GLUCOSAMINE-CHONDROITIN PO     Multiple Vitamin (MULTIVITAMIN PO)     Multiple Vitamins-Minerals (CENTRUM VITAMINTS PO)     Omega-3 Fatty Acids (FISH OIL PO)     omeprazole (PRILOSEC) 20 MG DR capsule     triamcinolone (KENALOG) 0.1 % external ointment     No current facility-administered medications for this visit.      Past Medical History:   Patient Active Problem List   Diagnosis     Mycosis fungoides, unspecified body region (H)     Past Medical History:   Diagnosis Date     Basal cell carcinoma      Lymphoma (H)      Other disorder of menstruation and other abnormal bleeding from female genital tract     Created by Conversion        CC Janis Valles MD  [address]  [place] [address] on close of this encounter.

## 2022-11-22 NOTE — NURSING NOTE
Dermatology Rooming Note    Nena Huerta's goals for this visit include:   Chief Complaint   Patient presents with     Derm Problem     Follow up for T-Cells      Andres Breen, EMT-B

## 2022-11-22 NOTE — LETTER
"11/22/2022       RE: Nena Huerta  [address] [place] [address]     Dear Colleague,    Thank you for referring your patient, Nena Huerta, to the [facility] [place] at [facility]. Please see a copy of my visit note below.    [facility] Dermatology Note  Encounter Date: Nov 22, 2022  Office Visit     Dermatology Problem List:  1. CTCL, Stage 1A, bx 6/17/19 (read by [facility])  - CBC w/ diff, peripheral smear, LFT and LDH 7/30/19 within normal limits  - Tx: Triamcinolone 0.1% ointment BID  2. Hx of BCC on the right nare  3. Hx of \"pre-melanoma\" posterior right thigh  4. 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 Follows with [facility] for yearly skin check    ____________________________________________    Assessment & Plan:     # CTCL stage IA- stable  - Continue triamcinolone 0.1% ointment as needed   - Recent CBC with primary reviewed in [facility] 10/06/22 ( just slightly low wbc, 4.1, with normal differential)  - Discussed warning signs for progression and reviewed the recent data from [place] about high throughput DNA sequencing and TCF.  Will continue to monitor      Follow-up: 1 year(s) in-person, or earlier for new or changing lesions    Staff:     Janis Valles MD  ____________________________________________    CC: Derm Problem (Follow up for T-Cells )    HPI:  Ms. Nena Huerta is a(n) 62 year old female who presents today as a return patient for CTCL.  Overall stable.  No increased skin activity.  Does note more itch in winter at night.  Weigh stable.  Does have some stable night sweats.  Has yearly CBC with primary MD with no concerns.  Has year skin check ( upcoming next week) with [facility].     Patient is otherwise feeling well, without additional skin concerns.     Labs Reviewed:  See CBC above    Physical Exam:  Vitals: There were no vitals taken for " this visit.  SKIN: Focused examination of trunk and lower legs was performed.  - digitate red patches of flanks and pink annular patches of breasts  - No other lesions of concern on areas examined.     Medications:  Current Outpatient Medications   Medication     Ascorbic Acid (VITAMIN C PO)     atorvastatin (LIPITOR) 40 MG tablet     Calcium Carbonate-Vitamin D (CALTRATE 600+D PO)     Cholecalciferol (VITAMIN D3 PO)     coenzyme Q-10 10 MG CAPS     Coenzyme Q10 (COQ10 PO)     EPINEPHrine (EPIPEN/ADRENACLICK/OR ANY BX GENERIC EQUIV) 0.3 MG/0.3ML injection 2-pack     GLUCOSAMINE-CHONDROITIN PO     Multiple Vitamin (MULTIVITAMIN PO)     Multiple Vitamins-Minerals (CENTRUM VITAMINTS PO)     Omega-3 Fatty Acids (FISH OIL PO)     omeprazole (PRILOSEC) 20 MG DR capsule     triamcinolone (KENALOG) 0.1 % external ointment     No current facility-administered medications for this visit.      Past Medical History:   Patient Active Problem List   Diagnosis     Mycosis fungoides, unspecified body region (H)     Past Medical History:   Diagnosis Date     Basal cell carcinoma      Lymphoma (H)      Other disorder of menstruation and other abnormal bleeding from female genital tract     Created by Conversion        CC Janis Valles MD  [address]  [place] [address] on close of this encounter.

## 2022-12-02 ENCOUNTER — TRANSFERRED RECORDS (OUTPATIENT)
Dept: HEALTH INFORMATION MANAGEMENT | Facility: CLINIC | Age: 62
End: 2022-12-02

## 2022-12-05 ENCOUNTER — TRANSFERRED RECORDS (OUTPATIENT)
Dept: HEALTH INFORMATION MANAGEMENT | Facility: CLINIC | Age: 62
End: 2022-12-05

## 2023-01-09 ENCOUNTER — ANCILLARY PROCEDURE (OUTPATIENT)
Dept: MAMMOGRAPHY | Facility: CLINIC | Age: 63
End: 2023-01-09
Attending: INTERNAL MEDICINE
Payer: COMMERCIAL

## 2023-01-09 DIAGNOSIS — Z12.31 VISIT FOR SCREENING MAMMOGRAM: ICD-10-CM

## 2023-01-09 PROCEDURE — 77067 SCR MAMMO BI INCL CAD: CPT

## 2023-07-31 ENCOUNTER — HOSPITAL ENCOUNTER (OUTPATIENT)
Dept: BONE DENSITY | Facility: HOSPITAL | Age: 63
Discharge: HOME | End: 2023-07-31
Attending: OBSTETRICS & GYNECOLOGY | Admitting: OBSTETRICS & GYNECOLOGY
Payer: COMMERCIAL

## 2023-07-31 DIAGNOSIS — Z13.820 SCREENING FOR OSTEOPOROSIS: ICD-10-CM

## 2023-07-31 PROCEDURE — 77080 DXA BONE DENSITY AXIAL: CPT

## 2023-11-27 ENCOUNTER — OFFICE VISIT (OUTPATIENT)
Dept: DERMATOLOGY | Facility: CLINIC | Age: 63
End: 2023-11-27
Payer: COMMERCIAL

## 2023-11-27 DIAGNOSIS — C84.00 MYCOSIS FUNGOIDES, UNSPECIFIED BODY REGION (H): Primary | ICD-10-CM

## 2023-11-27 PROCEDURE — 99214 OFFICE O/P EST MOD 30 MIN: CPT | Performed by: DERMATOLOGY

## 2023-11-27 ASSESSMENT — PAIN SCALES - GENERAL: PAINLEVEL: NO PAIN (0)

## 2023-11-27 NOTE — NURSING NOTE
Dermatology Rooming Note    Nena Huerta's goals for this visit include:   Chief Complaint   Patient presents with    Derm Problem     CTCL- things are okay, gets very itchy at night. Sometimes rash is darker.      Natasha Hdez, EMT

## 2023-11-27 NOTE — PROGRESS NOTES
"[facility] Dermatology Note  Encounter Date: Nov 27, 2023  Office Visit     Dermatology Problem List:  1. 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 Hx of BCC on the right nare  3. Hx of \"pre-melanoma\" posterior right thigh  4. 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 Follows with [facility] for yearly skin check    ____________________________________________    Assessment & Plan:    #CTCL [id number], stable  The lesions are stable, located on the trunk, and BSA is 2%. Has triamcinolone 0.1% ointment, but not currently using. No new B symptoms. CBC with diff on 10/9/23 is wnl. Diffuse pruritus, but TSH, BMP, CBC, LFTs from 10/2023 wnl. Discussed potential initiation of light therapy for pruritus and rash. Patient will consider it. Discussed red flag symptoms and return precautions.  - Continue triamcinolone 0.1% ointment as needed    #Hx BCC  Yearly skin check with [facility] in mid-December 2023    #Periungal Fissures  Painful fissure on distal fingertips. Recommended nightly Vaseline application beneath cotton gloves.      Follow-up: 1 year, or earlier for new or changing lesions    Staff and Medical Student:     Seen and staffed with Dr. Reena Valles, MS3  I was present with the medical student who participated in the service and in the documentation of the note. I have verified the history and personally performed the physical exam and medical decision making. I agree with the assessment and plan of care as documented in the note.  Janis Valles MD   ____________________________________________    CC: CTCL Follow up    HPI:  Ms. Nena Huerta is a(n) 63 year old female who presents today as a return patient for CTCL stage 1A. She is feeling well. She has some parathyroid issues that are getting followed up by " her PCP. She says her rash is stable. Sometimes it is darker or more colorful (particularly in the summer), but is currently pale. She also has itch that worsens in the night. It is all over her body and not limited to areas of her rash. She says she has lost 10 pounds in the last year (and ~40 pounds in the last few years), but it has been gradual loss. She also has night sweats (not associated with hot flashes), but not enough to need to change her clothes. She has not noticed any masses or bumps. She is not more fatigued than usual.     She also has painful fissures on the distal fingertips (near nail plate). She has tried creams, but they do not seem to help.     Labs:  CBC with diff, TSH, BMP, LFTs from 10/2023 reviewed.    Physical Exam:  Vitals: There were no vitals taken for this visit.  SKIN: Focused examination of the trunk and buttocks was performed:  -Light pink annular patches on breasts and trunk  -Digitate pink patches of R flank  -Estimated BSA of ~2%  -Periungual fissures of  multiple distal fingertips  -No palpable masses in axillae or neck  - No other lesions of concern on areas examined.     Medications:  Current Outpatient Medications   Medication    Ascorbic Acid (VITAMIN C PO)    atorvastatin (LIPITOR) 40 MG tablet    Calcium Carbonate-Vitamin D (CALTRATE 600+D PO)    Cholecalciferol (VITAMIN D3 PO)    coenzyme Q-10 10 MG CAPS    Coenzyme Q10 (COQ10 PO)    EPINEPHrine (EPIPEN/ADRENACLICK/OR ANY BX GENERIC EQUIV) 0.3 MG/0.3ML injection 2-pack    GLUCOSAMINE-CHONDROITIN PO    Multiple Vitamin (MULTIVITAMIN PO)    Multiple Vitamins-Minerals (CENTRUM VITAMINTS PO)    Omega-3 Fatty Acids (FISH OIL PO)    omeprazole (PRILOSEC) 20 MG DR capsule    triamcinolone (KENALOG) 0.1 % external ointment     No current facility-administered medications for this visit.      Past Medical History:   Patient Active Problem List   Diagnosis    Mycosis fungoides, unspecified body region (H)     Past Medical History:    Diagnosis Date    Basal cell carcinoma     Lymphoma (H)     Other disorder of menstruation and other abnormal bleeding from female genital tract     Created by Conversion         CC Janis Valles MD  [address]  [place] [address] on close of this encounter.

## 2023-11-27 NOTE — LETTER
"11/27/2023       RE: Nena Heurta  [address] [place] [address]     Dear Colleague,    Thank you for referring your patient, Nena Huerta, to the [facility] DERMATOLOGY CLINIC [place] at [facility]. Please see a copy of my visit note below.    [facility] Dermatology Note  Encounter Date: Nov 27, 2023  Office Visit     Dermatology Problem List:  1. CTCL, Stage 1A, bx 6/17/19 (read by [facility])  - Initial lab work up 7/30/19 (CBC w/ diff, peripheral smear, LFTs and LDH) wnl  - Tx: Triamcinolone 0.1% ointment as needed  2. Hx of BCC on the right nare  3. Hx of \"pre-melanoma\" posterior right thigh  4. 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 Follows with [facility] for yearly skin check    ____________________________________________    Assessment & Plan:    #CTCL [id number], stable  The lesions are stable, located on the trunk, and BSA is 2%. Has triamcinolone 0.1% ointment, but not currently using. No new B symptoms. CBC with diff on 10/9/23 is wnl. Diffuse pruritus, but TSH, BMP, CBC, LFTs from 10/2023 wnl. Discussed potential initiation of light therapy for pruritus and rash. Patient will consider it. Discussed red flag symptoms and return precautions.  - Continue triamcinolone 0.1% ointment as needed    #Hx BCC  Yearly skin check with [facility] in mid-December 2023    #Periungal Fissures  Painful fissure on distal fingertips. Recommended nightly Vaseline application beneath cotton gloves.      Follow-up: 1 year, or earlier for new or changing lesions    Staff and Medical Student:     Seen and staffed with Dr. Reena Valles, MS3  I was present with the medical student who participated in the service and in the documentation of the note. I have verified the history and personally performed the physical exam and medical decision making. I agree with the assessment and plan of care " as documented in the note.  Janis Valles MD   ____________________________________________    CC: CTCL Follow up    HPI:  Ms. Nena Huerta is a(n) 63 year old female who presents today as a return patient for CTCL stage 1A. She is feeling well. She has some parathyroid issues that are getting followed up by her PCP. She says her rash is stable. Sometimes it is darker or more colorful (particularly in the summer), but is currently pale. She also has itch that worsens in the night. It is all over her body and not limited to areas of her rash. She says she has lost 10 pounds in the last year (and ~40 pounds in the last few years), but it has been gradual loss. She also has night sweats (not associated with hot flashes), but not enough to need to change her clothes. She has not noticed any masses or bumps. She is not more fatigued than usual.     She also has painful fissures on the distal fingertips (near nail plate). She has tried creams, but they do not seem to help.     Labs:  CBC with diff, TSH, BMP, LFTs from 10/2023 reviewed.    Physical Exam:  Vitals: There were no vitals taken for this visit.  SKIN: Focused examination of the trunk and buttocks was performed:  -Light pink annular patches on breasts and trunk  -Digitate pink patches of R flank  -Estimated BSA of ~2%  -Periungual fissures of  multiple distal fingertips  -No palpable masses in axillae or neck  - No other lesions of concern on areas examined.     Medications:  Current Outpatient Medications   Medication    Ascorbic Acid (VITAMIN C PO)    atorvastatin (LIPITOR) 40 MG tablet    Calcium Carbonate-Vitamin D (CALTRATE 600+D PO)    Cholecalciferol (VITAMIN D3 PO)    coenzyme Q-10 10 MG CAPS    Coenzyme Q10 (COQ10 PO)    EPINEPHrine (EPIPEN/ADRENACLICK/OR ANY BX GENERIC EQUIV) 0.3 MG/0.3ML injection 2-pack    GLUCOSAMINE-CHONDROITIN PO    Multiple Vitamin (MULTIVITAMIN PO)    Multiple Vitamins-Minerals (CENTRUM VITAMINTS PO)    Omega-3 Fatty Acids  (FISH OIL PO)    omeprazole (PRILOSEC) 20 MG DR capsule    triamcinolone (KENALOG) 0.1 % external ointment     No current facility-administered medications for this visit.      Past Medical History:   Patient Active Problem List   Diagnosis    Mycosis fungoides, unspecified body region (H)     Past Medical History:   Diagnosis Date    Basal cell carcinoma     Lymphoma (H)     Other disorder of menstruation and other abnormal bleeding from female genital tract     Created by Conversion         CC Janis Valles MD  [address] [facility] [address]  [place] [address] on close of this encounter.

## 2023-12-29 ENCOUNTER — TRANSFERRED RECORDS (OUTPATIENT)
Dept: HEALTH INFORMATION MANAGEMENT | Facility: CLINIC | Age: 63
End: 2023-12-29
Payer: COMMERCIAL

## 2024-01-10 ENCOUNTER — ANCILLARY PROCEDURE (OUTPATIENT)
Dept: MAMMOGRAPHY | Facility: CLINIC | Age: 64
End: 2024-01-10
Attending: OBSTETRICS & GYNECOLOGY
Payer: COMMERCIAL

## 2024-01-10 DIAGNOSIS — Z12.31 VISIT FOR SCREENING MAMMOGRAM: ICD-10-CM

## 2024-01-10 PROCEDURE — 77063 BREAST TOMOSYNTHESIS BI: CPT

## 2024-04-22 ENCOUNTER — TRANSFERRED RECORDS (OUTPATIENT)
Dept: HEALTH INFORMATION MANAGEMENT | Facility: CLINIC | Age: 64
End: 2024-04-22
Payer: COMMERCIAL

## 2024-05-13 ENCOUNTER — TRANSFERRED RECORDS (OUTPATIENT)
Dept: HEALTH INFORMATION MANAGEMENT | Facility: CLINIC | Age: 64
End: 2024-05-13
Payer: COMMERCIAL

## 2024-10-03 DIAGNOSIS — C84.00 MYCOSIS FUNGOIDES, UNSPECIFIED BODY REGION (H): ICD-10-CM

## 2024-10-09 RX ORDER — TRIAMCINOLONE ACETONIDE 1 MG/G
OINTMENT TOPICAL
Qty: 80 G | Refills: 2 | Status: SHIPPED | OUTPATIENT
Start: 2024-10-09

## 2024-10-09 NOTE — TELEPHONE ENCOUNTER
"  triamcinolone (KENALOG) 0.1 % external ointment       Last Written Prescription Date:  8/26/2019  Last Fill Quantity: 80 g ,   # refills: 2  Last Office Visit : 11/27/23' Continue triamcinolone 0.1% ointment as needed     Future Office visit:  11/25/24    Routing refill request to provider for review/approval because:  Last order is from 2019. New order needed.   Last note 11/23/23\"Continue triamcinolone 0.1% ointment as needed\"  Thank you.     "

## 2024-12-04 ENCOUNTER — OFFICE VISIT (OUTPATIENT)
Dept: DERMATOLOGY | Facility: CLINIC | Age: 64
End: 2024-12-04
Payer: COMMERCIAL

## 2024-12-04 DIAGNOSIS — C84.00 MYCOSIS FUNGOIDES, UNSPECIFIED BODY REGION (H): ICD-10-CM

## 2024-12-04 PROCEDURE — 99214 OFFICE O/P EST MOD 30 MIN: CPT | Mod: GC | Performed by: DERMATOLOGY

## 2024-12-04 RX ORDER — TRIAMCINOLONE ACETONIDE 1 MG/G
OINTMENT TOPICAL
Qty: 80 G | Refills: 2 | Status: SHIPPED | OUTPATIENT
Start: 2024-12-04

## 2024-12-04 ASSESSMENT — PAIN SCALES - GENERAL: PAINLEVEL_OUTOF10: NO PAIN (0)

## 2024-12-04 NOTE — PATIENT INSTRUCTIONS
Please follow up with your primary care doctor to discuss the night sweats. 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     Please use vanicream during the winter time, this will help with the itch.     General Gentle Skin Care Recommendations  The products listed are not exclusive and generic products or others not on the list may be an okay substitute, but make sure they are fragrance free and reading labels is very important    Below is a list of products our providers recommend for gentle skin care.  Moisturizers:    Lighter; Cetaphil Cream, CeraVe Cream, Aveeno Positively Radiant, Pipette, Vanicream lotion    Thicker; Aquaphor Ointment, Vaseline, Petroleum Jelly, Eucerin Original Healing Cream, Vanicream Cream, CeraVe Healing Ointment, Aquaphor Body Spray, Vanicream    Lotions are too thin to provide adequate moisture to the skin. Thicker options such as creams or ointments are recommended  Mild Cleansers:    Dove- Fragrance Free bar or wash  CeraVe   Eucerin Baby  Vanicream Cleansing bar  Cetaphil Cleanser   Aquaphor 2 in1 Gentle Wash and Shampoo  Dove Baby wash  Pipette Fragrance Free  CLn wash        Laundry Products:    All Free and Clear  Cheer Free  Generic Brands are okay if they are  Fragrance Free      Avoid fabric softeners and dryer sheets. These add unnecessary chemicals to clothing Sunscreens: SPF 30 or greater     Choose mineral-based sunscreens with active ingredients of only Zinc Oxide and/or Titanium Dioxide   It is safe to apply a small amount of mineral-based sunscreen to sun-exposed skin on infants under 6 months of age (face, hands, etc.)     Examples:  Aveeno Active Natural Protection Mineral Block Lotion SPF 30  Blue Lizard for Sensitive Skin SPF 30+  Mustella Broad Spectrum SPF 50+/Mineral Sunscreen Stick    Thinkbaby Safe Sunscreen SPF 50+  Vanicream Sunscreen for Sensitive Skin SPF 30 or 50  [facility] s Sensitive Skin SPF 70    Avoid spray sunscreens. Most  contain chemical sunscreen ingredients and can be easily inhaled during application     Shampoo and Conditioners:  (Some baby washes may be used as a shampoo)    Free and Clear by Vanicream  Aquaphor 2 in 1 Gentle Wash and Shampoo  Pipette Baby Fragrance Free  Mustela Fragrance Free   Sheen Shampoo   CLn Shampoo    Oils:  Mineral Oil   Coconut (raw, unrefined, organic)   Sunflower seed oil     Avoid olive oil   Avoid essential oils (see below)         Why fragrance free?  Infant skin is thinner than adult skin and is more prone to irritation and absorption of fragrance or chemical ingredients. Fragrances can irritate the skin of infants and children with eczema.     Why avoid essential oils on the skin?  Essential oils like lavender are very concentrated and will be absorbed into an infant s skin.   Essential oils can be very irritating and cause severe rash on the skin   Lavender and other essential oils are commonly found in baby care products. When these products are applied repeatedly to the skin and/or occluded in the diaper region, this can enhance the risk for absorption or irritation.       What about  organic  or  natural  products?  Organic or natural does not mean  fragrance free  or gentle. In fact, many organic products are very irritating to the skin  Patients with sensitive skin may be sensitive to ingredients like fragrance, essential oils, or botanical extracts in these products.    Bathing and moisturization recommendations:  Bathe once daily. Soaking in a bath is more hydrating for the skin than a shower.  Keep bathing and showering to less than 15 minutes   Use warm water, but AVOID HOT or COLD water  DO NOT use bubble bath or other products which excessively foam. These strip moisture from the skin  Limit the use of soaps, focusing on the skin folds, face, armpits, groin and feet.  Do NOT vigorously scrub when you cleanse the skin or use a loofa  After bathing, PAT your skin lightly with a  towel. DO NOT rub or scrub when drying  ALWAYS apply moisturizer immediately after bathing. This helps to  lock in  the moisture.   Reapply moisturizers at least twice daily to your whole body   Your provider may recommend a lighter or heavier moisturizer based on your child s skin condition.  We recommend ointment-based moisturizers or thick creams. Avoid lotions as they are not thick enough to hydrate the skin and often contain irritating chemicals and preservatives  Lotions and thinner creams can sting and burn when applied. Ointment-based moisturizers are better tolerated when skin is inflamed or if there are open wounds.   If you were prescribed a topical medication, follow the instructions for application as provided by your pediatric dermatology provider, but typically these should be applied first before applying moisturizer    Other helpful tips:  Avoid scented products such as powders, perfumes, or colognes  Essential oil diffusers can be harsh on sensitive skin, use with caution   Avoid saunas and steam baths. (This temperature is too HOT)  Choose breathable clothing such as cotton or bamboo   Avoid tight or  scratchy  clothing such as wool or polyester   Always wash new clothing before wearing them for the first time  Sometimes a humidifier or vaporizer can be used at night to help the dry skin. Remember to keep these items clean to avoid mold growth

## 2024-12-04 NOTE — PROGRESS NOTES
"[facility] Dermatology Note  Encounter Date: Dec 4, 2024  Office Visit     Dermatology Problem List:  1. 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 Hx of BCC on the right nare  3. Hx of \"pre-melanoma\" posterior right thigh  4. 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 Follows with [facility] for yearly skin check    ____________________________________________    Assessment & Plan:   #CTCL [id number]  The lesions are stable, located on the trunk, and BSA is 5 % (2% at last visit). Has triamcinolone 0.1% ointment, but not currently using. She reports chronic B symptoms of night sweats and weight loss, although patient says these are not new. Recent blood work is largely wnl. Has pruritus that is worse in the winter. Discussed potential initiation of light therapy for pruritus and rash. Patient will consider it.   - Recommend discussing night sweats with PCP. Unlikely related to CTCL given limited stage. Reassured by recently normal blood work.  - Continue triamcinolone 0.1% ointment as needed  - Continue annual exams  - Reviewed return precautions     #Hx BCC  Yearly skin check with [facility] in mid-December 2023    # Xerosis  Recommend regular use of Vanicream.  - vanicream application regularly, especially after showers    Procedures Performed:   None    Follow-up: 1 year(s) in-person, or earlier for new or changing lesions    Staff and Resident:     Tiffany SAMSON PGY3, saw and staffed this patient with the attending. All recommendations, therapies and procedures were pre-staffed with the attending or administered with direct supervision.   Janis SAMSON MD, saw this patient with the resident and agree with the resident s findings and plan of care as documented in the resident s " note.      ____________________________________________    CC: Derm Problem (T cell- No flare ups since last visit. )    HPI:  Ms. Nena Huerta is a(n) 64 year old female who presents today as a return patient for MF.    Not using triamcinolone regularly. Only uses it very infrequently if pruritic. Seems to get itchier in the winter.     Gets night sweats - happens occasionally, probably weekly. No persistent fevers. Yes to weight loss: lost about 20-30 pounds over two years - wasn't trying but eats carefully and exercises regularly.      Patient is otherwise feeling well, without additional skin concerns.    Labs Reviewed:  N/A    Physical Exam:  Vitals: There were no vitals taken for this visit.  SKIN: Waist-up skin, which includes the head/face, neck, both arms, chest, back, abdomen, digits and/or nails was examined.  - on the trunk there are digitate pink slightly scaly plaques (lateral trunk, low back, breast)  - No palpable lymphadenopathy   - No other lesions of concern on areas examined.     Medications:  Current Outpatient Medications   Medication Sig Dispense Refill    Ascorbic Acid (VITAMIN C PO)       atorvastatin (LIPITOR) 40 MG tablet Take 20 mg by mouth daily.      Calcium Carbonate-Vitamin D (CALTRATE 600+D PO)       Cholecalciferol (VITAMIN D3 PO)       coenzyme Q-10 10 MG CAPS       EPINEPHrine (EPIPEN/ADRENACLICK/OR ANY BX GENERIC EQUIV) 0.3 MG/0.3ML injection 2-pack USE AS DIRECTED      GLUCOSAMINE-CHONDROITIN PO       Multiple Vitamin (MULTIVITAMIN PO) Take 1 tablet by mouth      Multiple Vitamins-Minerals (CENTRUM VITAMINTS PO)       Omega-3 Fatty Acids (FISH OIL PO)       omeprazole (PRILOSEC) 20 MG DR capsule Take 20 mg by mouth 2 times daily. Once daily  10    triamcinolone (KENALOG) 0.1 % external ointment Apply two times per day to the affected areas on the trunk. 80 g 2    Coenzyme Q10 (COQ10 PO) Take 1 tablet by mouth (Patient not taking: Reported on 12/4/2024)       No current  facility-administered medications for this visit.      Past Medical History:   Patient Active Problem List   Diagnosis    Mycosis fungoides, unspecified body region (H)     Past Medical History:   Diagnosis Date    Basal cell carcinoma     Lymphoma (H)     Other disorder of menstruation and other abnormal bleeding from female genital tract     Created by Conversion        CC Referred Self, MD  No address on file on close of this encounter.

## 2024-12-04 NOTE — LETTER
"12/4/2024       RE: Nena Huerta  [address] [place] [address]     Dear Colleague,    Thank you for referring your patient, Nena Huerta, to the [facility] [place] [facility]. Please see a copy of my visit note below.    [facility] Dermatology Note  Encounter Date: Dec 4, 2024  Office Visit     Dermatology Problem List:  1. 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 Hx of BCC on the right nare  3. Hx of \"pre-melanoma\" posterior right thigh  4. 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 Follows with [facility] for yearly skin check    ____________________________________________    Assessment & Plan:   #CTCL [id number]  The lesions are stable, located on the trunk, and BSA is 5 % (2% at last visit). Has triamcinolone 0.1% ointment, but not currently using. She reports chronic B symptoms of night sweats and weight loss, although patient says these are not new. Recent blood work is largely wnl. Has pruritus that is worse in the winter. Discussed potential initiation of light therapy for pruritus and rash. Patient will consider it.   - Recommend discussing night sweats with PCP. Unlikely related to CTCL given limited stage. Reassured by recently normal blood work.  - Continue triamcinolone 0.1% ointment as needed  - Continue annual exams  - Reviewed return precautions     #Hx BCC  Yearly skin check with [facility] in mid-December 2023    # Xerosis  Recommend regular use of Vanicream.  - vanicream application regularly, especially after showers    Procedures Performed:   None    Follow-up: 1 year(s) in-person, or earlier for new or changing lesions    Staff and Resident:     Tiffany SAMSON PGY3, saw and staffed this patient with the attending. " All recommendations, therapies and procedures were pre-staffed with the attending or administered with direct supervision.   I, Janis Valles MD, saw this patient with the resident and agree with the resident s findings and plan of care as documented in the resident s note.      ____________________________________________    CC: Derm Problem (T cell- No flare ups since last visit. )    HPI:  Ms. Nena Huerta is a(n) 64 year old female who presents today as a return patient for MF.    Not using triamcinolone regularly. Only uses it very infrequently if pruritic. Seems to get itchier in the winter.     Gets night sweats - happens occasionally, probably weekly. No persistent fevers. Yes to weight loss: lost about 20-30 pounds over two years - wasn't trying but eats carefully and exercises regularly.      Patient is otherwise feeling well, without additional skin concerns.    Labs Reviewed:  N/A    Physical Exam:  Vitals: There were no vitals taken for this visit.  SKIN: Waist-up skin, which includes the head/face, neck, both arms, chest, back, abdomen, digits and/or nails was examined.  - on the trunk there are digitate pink slightly scaly plaques (lateral trunk, low back, breast)  - No palpable lymphadenopathy   - No other lesions of concern on areas examined.     Medications:  Current Outpatient Medications   Medication Sig Dispense Refill     Ascorbic Acid (VITAMIN C PO)        atorvastatin (LIPITOR) 40 MG tablet Take 20 mg by mouth daily.       Calcium Carbonate-Vitamin D (CALTRATE 600+D PO)        Cholecalciferol (VITAMIN D3 PO)        coenzyme Q-10 10 MG CAPS        EPINEPHrine (EPIPEN/ADRENACLICK/OR ANY BX GENERIC EQUIV) 0.3 MG/0.3ML injection 2-pack USE AS DIRECTED       GLUCOSAMINE-CHONDROITIN PO        Multiple Vitamin (MULTIVITAMIN PO) Take 1 tablet by mouth       Multiple Vitamins-Minerals (CENTRUM VITAMINTS PO)        Omega-3 Fatty Acids (FISH OIL PO)        omeprazole (PRILOSEC) 20 MG DR capsule  Take 20 mg by mouth 2 times daily. Once daily  10     triamcinolone (KENALOG) 0.1 % external ointment Apply two times per day to the affected areas on the trunk. 80 g 2     Coenzyme Q10 (COQ10 PO) Take 1 tablet by mouth (Patient not taking: Reported on 12/4/2024)       No current facility-administered medications for this visit.      Past Medical History:   Patient Active Problem List   Diagnosis     Mycosis fungoides, unspecified body region (H)     Past Medical History:   Diagnosis Date     Basal cell carcinoma      Lymphoma (H)      Other disorder of menstruation and other abnormal bleeding from female genital tract     Created by Conversion        CC Referred Self, MD  No address on file on close of this encounter.       Again, thank you for allowing me to participate in the care of your patient.      Sincerely,    Janis Valles MD

## 2025-01-08 ENCOUNTER — ANCILLARY PROCEDURE (OUTPATIENT)
Dept: MAMMOGRAPHY | Facility: CLINIC | Age: 65
End: 2025-01-08
Attending: OBSTETRICS & GYNECOLOGY
Payer: MEDICARE

## 2025-01-08 DIAGNOSIS — Z12.31 SCREENING MAMMOGRAM, ENCOUNTER FOR: ICD-10-CM

## 2025-01-08 PROCEDURE — 77063 BREAST TOMOSYNTHESIS BI: CPT

## 2025-01-08 PROCEDURE — 77067 SCR MAMMO BI INCL CAD: CPT

## 2025-07-07 ENCOUNTER — TRANSCRIBE ORDERS (OUTPATIENT)
Dept: OTHER | Age: 65
End: 2025-07-07

## 2025-07-07 DIAGNOSIS — Z13.79 GENETIC TESTING: Primary | ICD-10-CM

## 2025-07-18 ENCOUNTER — VIRTUAL VISIT (OUTPATIENT)
Dept: CONSULT | Facility: CLINIC | Age: 65
End: 2025-07-18
Attending: GENETIC COUNSELOR, MS
Payer: MEDICARE

## 2025-07-18 DIAGNOSIS — Z84.89 FAMILY HISTORY OF GENETIC DISORDER: Primary | ICD-10-CM

## 2025-07-18 DIAGNOSIS — Z13.79 GENETIC TESTING: ICD-10-CM

## 2025-07-18 PROCEDURE — 96041 GENETIC COUNSELING SVC EA 30: CPT | Mod: GT,95 | Performed by: GENETIC COUNSELOR, MS

## 2025-07-18 NOTE — LETTER
"2025      RE: Nena Huerta  [address] [place] [address]     Dear Colleague,    Thank you for the opportunity to participate in the care of your patient, Nena Huerta, at the [facility] at [facility]. Please see a copy of my visit note below.    Name:  Nena Huerta \"Nena\"  :   1960  MRN:   [id number]  Date of service: 2025  Primary Provider: Sofia Chávez  Referring Provider: Sofia Chávez    PRESENTING INFORMATION   Reason for consultation:  A consultation in the [facility] was requested for Nena, a 64 year old female, for evaluation of The primary encounter diagnosis was Family history of genetic disorder. A diagnosis of Genetic testing was also pertinent to this visit.     Nena was accompanied to this visit by her .     History is obtained from Patient, electronic health record, and . I met with Nena at the request of Dr. Sofia Chávez to obtain a personal and family history, discuss possible genetic contributions to her symptoms, and to obtain informed consent for genetic testing if indicated.      ASSESSMENT & PLAN  Nena is a 64 year old female with a family history of [place] syndrome due to PTPN11. She has ?strabismus and a history of T-cell lymphoma.   Genetic testing for [place] syndrome was offered today. We will send next generation sequencing to [facility], who performed testing for her relatives.  Genetic testing will be done free of charge. If positive, we can refer her to see one of our geneticists for medical management. We can also pursue genetic testing for other relatives. Nena provided informed consent for the testing, signed with verbal consent.     blood sample will be collected and [facility]PN11 testing  Orders Placed This Encounter   Procedures     Other Laboratory; [facility]; PTPN11 next-generation " "sequencing panel (no test code). DO NOT BILL PATIENT (Laboratory Miscellaneous Order)     After testing is initiated, results will be returned by phone in 3 weeks. Follow-up dependent on results. PHI OK to share results with her   Contact information was provided should any questions arise in the future.       HPI:  Nena Huerta, a 64-year-old female, presented for evaluation due to a family history of Karrie syndrome in both daughters and multiple grandchildren.     She has a history of high cholesterol (on atorvastatin) and cutaneous T-cell lymphoma (diagnosed ~5 years ago, monitored annually). She is 5'2.5\" for a midparental height of 5'3.5\". She had vision therapy as a child. She is followed by her internist and dermatologist. She denies other significant personal health issues (birth defects, seizures, delays, learning differences, hearing loss, other vision concerns, heart disease, kidney disease, thrombocytopenia, delayed puberty, tumors, cancers, other major concerns)..    Patient Active Problem List   Diagnosis     Mycosis fungoides, unspecified body region (H)       Pertinent studies/abnormal test results:   No history of genetic testing    Past Medical History:  Past Medical History:   Diagnosis Date     Basal cell carcinoma      Lymphoma (H)      Other disorder of menstruation and other abnormal bleeding from female genital tract     Created by Conversion        Past Surgical History:  Past Surgical History:   Procedure Laterality Date     APPENDECTOMY  2012     BIOPSY BREAST Right     benign      SECTION       CHOLECYSTECTOMY       HYSTERECTOMY  2012     OOPHORECTOMY Bilateral      TONSILLECTOMY       [facility] TOTAL ABDOM HYSTERECTOMY      Description: Total Abdominal Hysterectomy;  Recorded: 2012;        FAMILY HISTORY  A three generation pedigree was obtained today and scanned into the EMR.     Children:  - Two daughters (Sruthi and Ani), both presumed to have " "Karrie syndrome (not yet tested). Their children were diagnosed with [place] syndrome   - Ursula (diagnosed with Karrie syndrome, significant hearing issues, uses hearing aids)  - Quang (diagnosed with [place] syndrome, hearing issues less severe than Ursula). Other grandchildren are undergoing or planning genetic testing.  - Oldest son Hima had ITP at age 2, undescended testicles, and infertility.  He has a genetic counseling visit scheduled to coordinate testing.  His daughter has a heart defect     Siblings:  - One brother and two sisters (one brother and one sister not in contact). One sister has mental health concerns. No other major health concerns known in siblings. Nieces (children of sister) are very short; nephew is tall.      Maternal relatives:  - Mother (adopted) was a smoker until age 50, developed COPD/emphysema, underwent bypass surgery in her mid to late 70s,  at age 80 from pulmonary fibrosis. Mother's brother (uncle) was short (estimated 5'4\"-5'5\") and  from lung cancer (smoker). Multiple miscarriages in mother. Maternal uncle Soto was short.     Paternal relatives:  - Father had hearing loss since childhood (no hearing in left ear), developed cholesteatoma in same ear (surgically treated in late 50s/early 60s), persistent ear infections,  at age 94 from dementia. Paternal grandmother had pancreatic cancer (older age). Paternal uncle  from heart disease at age 75. Father was an identical twin.     Pertinent negatives: No known consanguinity. No known family history of birth defects (other than as described), kidney problems, early hearing loss (other than as described), developmental delays, learning issues, or early cancers (other than as described). Consanguinity is denied.    SOCIAL HISTORY  Lives with . Retired. Cares for grandkids sometimes    DISCUSSION  We reviewed clinical features of [place] syndrome, as well as the genetics and inheritance of the condition, " and genetic testing.    Clinical Features  Karrie syndrome affects approximately 1 in 1000 to 1 in 2500 persons.  Clinical features include, but are not limited to:    Short stature  Characteristic facial features  Cardiovascular anomalies including structural defects (pulmonary valve stenosis, hypertrophic cardiomyopathy (HCM), septal defects, etc.) and EKG abnormalities  Edema  Renal anomalies  Cryptorchidism  Reduced fertility in males due to both cryptorchidism and sertoli insufficiency  Puberty delay  Bleeding diathesis  Ocular anomalies (strabismus, refractive, etc.)  Hearing loss  Dermatologic concerns (keratosis, CALMs, lentigines)  Increased risk for malignancy (JMML, AML, ALL, rhabdomyosarcoma, neuroblastoma)  Developmental delay  Intellectual disability (usually mild)  Anxiety, depression, low self-esteem, etc.  Articulation deficiency and other learning disabilities    Prenatal features are nonspecific but may include polyhydramnios, hydronephrosis, pleural effusion, edema, cardiac defects, distended jugular lymphatic sacs, cystic hygroma, and increased nuchal translucency.    [place] syndrome exhibits significant variable expressivity. Mildly affected individuals are frequently overlooked.     Genetic Etiology  Today we reviewed that our genetic material or DNA is responsible for how our bodies grow and develop. It can be thought of as an instruction manual. This instruction manual is made up of chapters called genes. Our genes are inherited on structures called chromosomes, of which we have 23 pairs for a total of 46. For each chromosome pair, one copy is inherited from the mother and one is inherited from the father. The chromosome pairs are numbered from 1 to 22, and the 23rd pair of chromsomes is called the sex chromsomes. These determine if we are a male or female.     Changes in the DNA sequence of a gene can cause the signs and symptoms of a genetic condition because the instructions it is  providing to the body have been altered. This can be a small spelling error in the gene, a large duplicated piece of information, or a large missing piece of information.     Karrie syndrome and related disorders are caused by changes in genes within or related to the KIM-MAPK signal transduction pathway for cell growth, division, and differentiation. Genetic variants are generally inherited in an autosomal dominant fashion, with 30-75% being inherited from a parent.    Resources  Resources were provided at today's appointment for both informational and support purposes:    Medline Plus  https://medlineplus.gov/genetics/condition/karrie-syndrome/    [place] Syndrome Foundation  Email: info@teamnoonan.org  Www.teamnoonan.org    [employer]Net  Phone: 929.409.6657  rasopathiesnet.org    [facility]  Email: contactus@Digistrive.org  [email or web address]    Fannie's Big Week Children's Book (Ages 9-12)  by Marco Antonio Frankel and Venkat Ochoa    Genetic Testing  Because there is a known familial variant for which to test, we can pursue targeted testing. This is the most efficient means of obtaining or excluding a genetic diagnosis of [place] syndrome due to the familial variant. This testing will look for the presence or absence of the familial variant and any other pathogenic variants in the gene. The potential results were discussed: positive or a negative. If positive, she will see a  for management. Genetic testing is therefore medically necessary.  Genetic testing is no charge through [facility] [employer] [facility] due to their familial variant program.         Lab results may be automatically released via [employer].  Department protocol is to hold genetic testing results until we have reviewed them. We will then contact the patient directly to disclose the results and ensure they receive a copy of the report. This protocol was reviewed and patient is in agreement to hold the results for genetics review and direct  contact.         Joya Keller [facility]  Genetic Counselor   [facility]   Phone: [phone]         30 minutes spent on the date of the encounter in chart review, patient visit, test coordination/review, documentation, and/or discussion with other providers about the issues documented above         This note was written with the assistance of voice recognition software and may contain occasional typographic errors. Please contact our office if you identify errors requiring correction.    Please do not hesitate to contact me if you have any questions/concerns.     Sincerely,       Joya Keller, GC

## 2025-07-18 NOTE — PROGRESS NOTES
"Name:  Nena Huerta \"Nena\"  :   1960  MRN:   [id number]  Date of service: 2025  Primary Provider: Sofia Chávez  Referring Provider: Sofia Chávez    PRESENTING INFORMATION   Reason for consultation:  A consultation in the [facility] was requested for Nena, a 64 year old female, for evaluation of The primary encounter diagnosis was Family history of genetic disorder. A diagnosis of Genetic testing was also pertinent to this visit.     Nena was accompanied to this visit by her .     History is obtained from Patient, electronic health record, and . I met with Nena at the request of Dr. Sofia Chávez to obtain a personal and family history, discuss possible genetic contributions to her symptoms, and to obtain informed consent for genetic testing if indicated.      ASSESSMENT & PLAN  Nean is a 64 year old female with a family history of [place] syndrome due to PTPN11. She has ?strabismus and a history of T-cell lymphoma.   Genetic testing for [place] syndrome was offered today. We will send next generation sequencing to [facility], who performed testing for her relatives.  Genetic testing will be done free of charge. If positive, we can refer her to see one of our geneticists for medical management. We can also pursue genetic testing for other relatives. Nena provided informed consent for the testing, signed with verbal consent.     blood sample will be collected and sent to [facility] for PTPN11 testing  Orders Placed This Encounter   Procedures    Other Laboratory; [facility]; PTPN11 next-generation sequencing panel (no test code). DO NOT BILL PATIENT (Laboratory Miscellaneous Order)     After testing is initiated, results will be returned by phone in 3 weeks. Follow-up dependent on results. PHI OK to share results with her   Contact information was provided should any questions arise in the future.       HPI:  Nena Huerta, a 64-year-old " "female, presented for evaluation due to a family history of [place] syndrome in both daughters and multiple grandchildren.     She has a history of high cholesterol (on atorvastatin) and cutaneous T-cell lymphoma (diagnosed ~5 years ago, monitored annually). She is 5'2.5\" for a midparental height of 5'3.5\". She had vision therapy as a child. She is followed by her internist and dermatologist. She denies other significant personal health issues (birth defects, seizures, delays, learning differences, hearing loss, other vision concerns, heart disease, kidney disease, thrombocytopenia, delayed puberty, tumors, cancers, other major concerns)..    Patient Active Problem List   Diagnosis    Mycosis fungoides, unspecified body region (H)       Pertinent studies/abnormal test results:   No history of genetic testing    Past Medical History:  Past Medical History:   Diagnosis Date    Basal cell carcinoma     Lymphoma (H)     Other disorder of menstruation and other abnormal bleeding from female genital tract     Created by Conversion        Past Surgical History:  Past Surgical History:   Procedure Laterality Date    APPENDECTOMY      BIOPSY BREAST Right     benign     SECTION      CHOLECYSTECTOMY      HYSTERECTOMY      OOPHORECTOMY Bilateral     TONSILLECTOMY  [address] [place] [address] TOTAL ABDOM HYSTERECTOMY      Description: Total Abdominal Hysterectomy;  Recorded: 2012;        FAMILY HISTORY  A three generation pedigree was obtained today and scanned into the EMR.     Children:  - Two daughters (Sruthi and Ani), both presumed to have Karrie syndrome (not yet tested). Their children were diagnosed with [place] syndrome   - Ursula (diagnosed with [place] syndrome, significant hearing issues, uses hearing aids)  - Quang (diagnosed with Karrie syndrome, hearing issues less severe than Ursula). Other grandchildren are undergoing or planning genetic testing.  - Oldest son Hima had ITP at age 2, " "undescended testicles, and infertility.  He has a genetic counseling visit scheduled to coordinate testing.  His daughter has a heart defect     Siblings:  - One brother and two sisters (one brother and one sister not in contact). One sister has mental health concerns. No other major health concerns known in siblings. Nieces (children of sister) are very short; nephew is tall.      Maternal relatives:  - Mother (adopted) was a smoker until age 50, developed COPD/emphysema, underwent bypass surgery in her mid to late 70s,  at age 80 from pulmonary fibrosis. Mother's brother (uncle) was short (estimated 5'4\"-5'5\") and  from lung cancer (smoker). Multiple miscarriages in mother. Maternal uncle Soto was short.     Paternal relatives:  - Father had hearing loss since childhood (no hearing in left ear), developed cholesteatoma in same ear (surgically treated in late 50s/early 60s), persistent ear infections,  at age 94 from dementia. Paternal grandmother had pancreatic cancer (older age). Paternal uncle  from heart disease at age 75. Father was an identical twin.     Pertinent negatives: No known consanguinity. No known family history of birth defects (other than as described), kidney problems, early hearing loss (other than as described), developmental delays, learning issues, or early cancers (other than as described). Consanguinity is denied.    SOCIAL HISTORY  Lives with . Retired. Cares for grandkids sometimes    DISCUSSION  We reviewed clinical features of [place] syndrome, as well as the genetics and inheritance of the condition, and genetic testing.    Clinical Features  [place] syndrome affects approximately 1 in 1000 to 1 in 2500 persons.  Clinical features include, but are not limited to:    Short stature  Characteristic facial features  Cardiovascular anomalies including structural defects (pulmonary valve stenosis, hypertrophic cardiomyopathy (HCM), septal defects, etc.) and EKG " abnormalities  Edema  Renal anomalies  Cryptorchidism  Reduced fertility in males due to both cryptorchidism and sertoli insufficiency  Puberty delay  Bleeding diathesis  Ocular anomalies (strabismus, refractive, etc.)  Hearing loss  Dermatologic concerns (keratosis, CALMs, lentigines)  Increased risk for malignancy (JMML, AML, ALL, rhabdomyosarcoma, neuroblastoma)  Developmental delay  Intellectual disability (usually mild)  Anxiety, depression, low self-esteem, etc.  Articulation deficiency and other learning disabilities    Prenatal features are nonspecific but may include polyhydramnios, hydronephrosis, pleural effusion, edema, cardiac defects, distended jugular lymphatic sacs, cystic hygroma, and increased nuchal translucency.    [place] syndrome exhibits significant variable expressivity. Mildly affected individuals are frequently overlooked.     Genetic Etiology  Today we reviewed that our genetic material or DNA is responsible for how our bodies grow and develop. It can be thought of as an instruction manual. This instruction manual is made up of chapters called genes. Our genes are inherited on structures called chromosomes, of which we have 23 pairs for a total of 46. For each chromosome pair, one copy is inherited from the mother and one is inherited from the father. The chromosome pairs are numbered from 1 to 22, and the 23rd pair of chromsomes is called the sex chromsomes. These determine if we are a male or female.     Changes in the DNA sequence of a gene can cause the signs and symptoms of a genetic condition because the instructions it is providing to the body have been altered. This can be a small spelling error in the gene, a large duplicated piece of information, or a large missing piece of information.     [place] syndrome and related disorders are caused by changes in genes within or related to the KIM-MAPK signal transduction pathway for cell growth, division, and differentiation. Genetic  variants are generally inherited in an autosomal dominant fashion, with 30-75% being inherited from a parent.    Resources  Resources were provided at today's appointment for both informational and support purposes:    Medline Plus  https://medlineplus.gov/genetics/condition/karrie-syndrome/    Karrie Syndrome Foundation  Email: info@teamnoonan.org  Www.teamnoonan.org    [employer]  Phone: 287.465.8805  rasopathiesnet.org    [facility]  Email: contactus@[email or web address].org  www.[employer].org    Fannie's Big Week Children's Book (Ages 9-12)  by Marco Antonio Frankel and Venkat Ochoa    Genetic Testing  Because there is a known familial variant for which to test, we can pursue targeted testing. This is the most efficient means of obtaining or excluding a genetic diagnosis of [place] syndrome due to the familial variant. This testing will look for the presence or absence of the familial variant and any other pathogenic variants in the gene. The potential results were discussed: positive or a negative. If positive, she will see a  for management. Genetic testing is therefore medically necessary.  Genetic testing is no charge through [facility] due to their familial variant program.         Lab results may be automatically released via [employer].  Department protocol is to hold genetic testing results until we have reviewed them. We will then contact the patient directly to disclose the results and ensure they receive a copy of the report. This protocol was reviewed and patient is in agreement to hold the results for genetics review and direct contact.         Joya Keller [facility]  Genetic Counselor   [facility]   Phone: [phone]         30 minutes spent on the date of the encounter in chart review, patient visit, test coordination/review, documentation, and/or discussion with other providers about the issues documented above         This note was written with the  assistance of voice recognition software and may contain occasional typographic errors. Please contact our office if you identify errors requiring correction.

## 2025-07-21 ENCOUNTER — LAB (OUTPATIENT)
Dept: LAB | Facility: CLINIC | Age: 65
End: 2025-07-21
Payer: MEDICARE

## 2025-07-21 DIAGNOSIS — Z84.89 FAMILY HISTORY OF GENETIC DISORDER: ICD-10-CM

## 2025-07-21 LAB
LAB ORDER RESULT STATUS: NORMAL
PERFORMING LABORATORY: NORMAL
TEST NAME: NORMAL

## 2025-07-21 PROCEDURE — 36415 COLL VENOUS BLD VENIPUNCTURE: CPT

## 2025-07-30 ENCOUNTER — TELEPHONE (OUTPATIENT)
Dept: CONSULT | Facility: CLINIC | Age: 65
End: 2025-07-30
Payer: COMMERCIAL

## 2025-07-30 LAB
SCANNED LAB RESULT: NORMAL
TEST NAME: NORMAL

## 2025-07-30 NOTE — TELEPHONE ENCOUNTER
Reason for Call  Called Nena and Kody to discuss the results of their genetic testing for the familial PTPN11 variant.     Results  Next Generation Sequencing (NGS) for PTPN11 was completed at [facility]. These results were negative. Her daughters inherited the variant her , Kody.    Plan  Follow-up if new concerns  Results mailed to home with interpretation note above  No additional questions or concerns. Contact information provided    Joya Keller [facility]  Genetic Counselor   [facility]   Phone: [phone]